# Patient Record
Sex: MALE | Race: WHITE | NOT HISPANIC OR LATINO | Employment: FULL TIME | URBAN - METROPOLITAN AREA
[De-identification: names, ages, dates, MRNs, and addresses within clinical notes are randomized per-mention and may not be internally consistent; named-entity substitution may affect disease eponyms.]

---

## 2017-03-01 ENCOUNTER — OFFICE VISIT (OUTPATIENT)
Dept: FAMILY MEDICINE | Facility: CLINIC | Age: 47
End: 2017-03-01
Payer: COMMERCIAL

## 2017-03-01 VITALS
HEART RATE: 86 BPM | WEIGHT: 235 LBS | TEMPERATURE: 98.9 F | BODY MASS INDEX: 33.64 KG/M2 | HEIGHT: 70 IN | OXYGEN SATURATION: 98 % | SYSTOLIC BLOOD PRESSURE: 138 MMHG | DIASTOLIC BLOOD PRESSURE: 86 MMHG

## 2017-03-01 DIAGNOSIS — Z12.5 SCREENING FOR PROSTATE CANCER: ICD-10-CM

## 2017-03-01 DIAGNOSIS — Z13.6 CARDIOVASCULAR SCREENING; LDL GOAL LESS THAN 130: ICD-10-CM

## 2017-03-01 DIAGNOSIS — Z00.00 ENCOUNTER FOR ROUTINE ADULT HEALTH EXAMINATION WITHOUT ABNORMAL FINDINGS: Primary | ICD-10-CM

## 2017-03-01 DIAGNOSIS — Z12.11 SCREEN FOR COLON CANCER: ICD-10-CM

## 2017-03-01 DIAGNOSIS — M10.09 IDIOPATHIC GOUT OF MULTIPLE SITES, UNSPECIFIED CHRONICITY: ICD-10-CM

## 2017-03-01 LAB
ALBUMIN SERPL-MCNC: 4.2 G/DL (ref 3.4–5)
ALBUMIN UR-MCNC: NEGATIVE MG/DL
ALP SERPL-CCNC: 54 U/L (ref 40–150)
ALT SERPL W P-5'-P-CCNC: 44 U/L (ref 0–70)
ANION GAP SERPL CALCULATED.3IONS-SCNC: 8 MMOL/L (ref 3–14)
APPEARANCE UR: CLEAR
AST SERPL W P-5'-P-CCNC: 17 U/L (ref 0–45)
BILIRUB SERPL-MCNC: 0.5 MG/DL (ref 0.2–1.3)
BILIRUB UR QL STRIP: NEGATIVE
BUN SERPL-MCNC: 16 MG/DL (ref 7–30)
CALCIUM SERPL-MCNC: 9.3 MG/DL (ref 8.5–10.1)
CHLORIDE SERPL-SCNC: 103 MMOL/L (ref 94–109)
CHOLEST SERPL-MCNC: 289 MG/DL
CK SERPL-CCNC: 97 U/L (ref 30–300)
CO2 SERPL-SCNC: 28 MMOL/L (ref 20–32)
COLOR UR AUTO: YELLOW
CREAT SERPL-MCNC: 0.88 MG/DL (ref 0.66–1.25)
CREAT UR-MCNC: 176 MG/DL
ERYTHROCYTE [DISTWIDTH] IN BLOOD BY AUTOMATED COUNT: 13.3 % (ref 10–15)
GFR SERPL CREATININE-BSD FRML MDRD: NORMAL ML/MIN/1.7M2
GLUCOSE SERPL-MCNC: 92 MG/DL (ref 70–99)
GLUCOSE UR STRIP-MCNC: NEGATIVE MG/DL
HCT VFR BLD AUTO: 47.4 % (ref 40–53)
HDLC SERPL-MCNC: 55 MG/DL
HGB BLD-MCNC: 16.1 G/DL (ref 13.3–17.7)
HGB UR QL STRIP: NEGATIVE
KETONES UR STRIP-MCNC: NEGATIVE MG/DL
LDLC SERPL CALC-MCNC: 197 MG/DL
LEUKOCYTE ESTERASE UR QL STRIP: NEGATIVE
MCH RBC QN AUTO: 31.1 PG (ref 26.5–33)
MCHC RBC AUTO-ENTMCNC: 34 G/DL (ref 31.5–36.5)
MCV RBC AUTO: 92 FL (ref 78–100)
MICROALBUMIN UR-MCNC: 8 MG/L
MICROALBUMIN/CREAT UR: 4.76 MG/G CR (ref 0–17)
NITRATE UR QL: NEGATIVE
NONHDLC SERPL-MCNC: 234 MG/DL
PH UR STRIP: 5.5 PH (ref 5–7)
PLATELET # BLD AUTO: 201 10E9/L (ref 150–450)
POTASSIUM SERPL-SCNC: 4.3 MMOL/L (ref 3.4–5.3)
PROT SERPL-MCNC: 7.8 G/DL (ref 6.8–8.8)
PSA SERPL-ACNC: 0.52 UG/L (ref 0–4)
RBC # BLD AUTO: 5.18 10E12/L (ref 4.4–5.9)
SODIUM SERPL-SCNC: 139 MMOL/L (ref 133–144)
SP GR UR STRIP: 1.02 (ref 1–1.03)
T4 FREE SERPL-MCNC: 0.87 NG/DL (ref 0.76–1.46)
TRIGL SERPL-MCNC: 187 MG/DL
TSH SERPL DL<=0.005 MIU/L-ACNC: 4.06 MU/L (ref 0.4–4)
URATE SERPL-MCNC: 7.1 MG/DL (ref 3.5–7.2)
URN SPEC COLLECT METH UR: NORMAL
UROBILINOGEN UR STRIP-ACNC: 0.2 EU/DL (ref 0.2–1)
WBC # BLD AUTO: 6 10E9/L (ref 4–11)

## 2017-03-01 PROCEDURE — 85027 COMPLETE CBC AUTOMATED: CPT | Performed by: FAMILY MEDICINE

## 2017-03-01 PROCEDURE — 80053 COMPREHEN METABOLIC PANEL: CPT | Performed by: FAMILY MEDICINE

## 2017-03-01 PROCEDURE — 82043 UR ALBUMIN QUANTITATIVE: CPT | Performed by: FAMILY MEDICINE

## 2017-03-01 PROCEDURE — 80061 LIPID PANEL: CPT | Performed by: FAMILY MEDICINE

## 2017-03-01 PROCEDURE — 81003 URINALYSIS AUTO W/O SCOPE: CPT | Performed by: FAMILY MEDICINE

## 2017-03-01 PROCEDURE — 84550 ASSAY OF BLOOD/URIC ACID: CPT | Performed by: FAMILY MEDICINE

## 2017-03-01 PROCEDURE — 84439 ASSAY OF FREE THYROXINE: CPT | Performed by: FAMILY MEDICINE

## 2017-03-01 PROCEDURE — 99396 PREV VISIT EST AGE 40-64: CPT | Performed by: FAMILY MEDICINE

## 2017-03-01 PROCEDURE — 82550 ASSAY OF CK (CPK): CPT | Performed by: FAMILY MEDICINE

## 2017-03-01 PROCEDURE — 36415 COLL VENOUS BLD VENIPUNCTURE: CPT | Performed by: FAMILY MEDICINE

## 2017-03-01 PROCEDURE — 84443 ASSAY THYROID STIM HORMONE: CPT | Performed by: FAMILY MEDICINE

## 2017-03-01 PROCEDURE — G0103 PSA SCREENING: HCPCS | Performed by: FAMILY MEDICINE

## 2017-03-01 NOTE — NURSING NOTE
"Chief Complaint   Patient presents with     Physical       Initial /86  Pulse 86  Temp 98.9  F (37.2  C) (Oral)  Ht 5' 10\" (1.778 m)  Wt 235 lb (106.6 kg)  SpO2 98%  BMI 33.72 kg/m2 Estimated body mass index is 33.72 kg/(m^2) as calculated from the following:    Height as of this encounter: 5' 10\" (1.778 m).    Weight as of this encounter: 235 lb (106.6 kg)..  BP completed using cuff size: heavenly Lombardi MA  "

## 2017-03-01 NOTE — PROGRESS NOTES
SUBJECTIVE:     CC: Bebo Lamb is an 47 year old male who presents for preventative health visit.     FH Prostate cancer - many close relatives    Gout -  No recent issues.    Right tib/fib fracture - Aches sometimes during heavy activity otherwise no complaints     Blood Pressure  BP Readings from Last 3 Encounters:   03/01/17 138/86   01/10/14 130/84     Healthy Habits:    Do you get at least three servings of calcium containing foods daily (dairy, green leafy vegetables, etc.)? yes    Amount of exercise or daily activities, outside of work: 1-2 day(s) per week    Problems taking medications regularly No    Medication side effects: No    Have you had an eye exam in the past two years? no    Do you see a dentist twice per year? No    Do you have sleep apnea, excessive snoring or daytime drowsiness?no    Today's PHQ-2 Score: No flowsheet data found.    Abuse: Current or Past(Physical, Sexual or Emotional)- No  Do you feel safe in your environment - Yes    Social History   Substance Use Topics     Smoking status: Never Smoker     Smokeless tobacco: Never Used     Alcohol use 5.0 oz/week     10 Standard drinks or equivalent per week      Comment: 1.5 drinks per day avg     The patient does not drink >3 drinks per day nor >7 drinks per week.    Reviewed orders with patient. Reviewed health maintenance and updated orders accordingly - Yes    Reviewed and updated as needed this visit by clinical staff  Tobacco  Allergies  Meds  Med Hx  Surg Hx  Fam Hx  Soc Hx      Reviewed and updated as needed this visit by Provider        Health Maintenance     Colonoscopy: Due at 51   FIT:  Not on file-ordered              PSA:  Not on file-ordered    DEXA:  N/A    Health Maintenance Due   Topic Date Due     LIPID SCREEN Q5 YR MALE (SYSTEM ASSIGNED)  01/19/2006       Current Problem List    Patient Active Problem List   Diagnosis     CARDIOVASCULAR SCREENING; LDL GOAL LESS THAN 130     Gout       Past Medical History    Past  "Medical History   Diagnosis Date     CARDIOVASCULAR SCREENING; LDL GOAL LESS THAN 130      Gout        Past Surgical History    Past Surgical History   Procedure Laterality Date     Surgical history of -   2009     Rt tib/fib Fx with ORIF       Current Medications    No current outpatient prescriptions on file.       Allergies    No Known Allergies    Immunizations    Immunization History   Administered Date(s) Administered     Tdap (Adacel,Boostrix) 01/01/2009       Family History    Family History   Problem Relation Age of Onset     Prostate Cancer Father 50     Prostate Cancer Paternal Grandfather      Neurologic Disorder Paternal Grandfather      Parkinson's     CANCER Brother 25     non hodkins lymphoma     Prostate Cancer Brother 55       Social History    Social History     Social History     Marital status: Single     Spouse name: Maryam Morales     Number of children: 0     Years of education: 16     Occupational History     Not on file.     Social History Main Topics     Smoking status: Never Smoker     Smokeless tobacco: Never Used     Alcohol use 5.0 oz/week     10 Standard drinks or equivalent per week      Comment: 1.5 drinks per day avg     Drug use: No     Sexual activity: Yes     Partners: Female     Other Topics Concern     Parent/Sibling W/ Cabg, Mi Or Angioplasty Before 65f 55m? No     Caffeine Concern Yes     2 cans daily     Exercise Yes     occas     Seat Belt Yes     Social History Narrative       ROS:  Constitutional, HEENT, cardiovascular, pulmonary, GI, , musculoskeletal, neuro, skin, endocrine and psych systems are negative, except as in HPI or otherwise noted       Problem list, Medication list, Allergies, and Medical/Social/Surgical histories reviewed in [facility] and updated as appropriate.  OBJECTIVE:     /86  Pulse 86  Temp 98.9  F (37.2  C) (Oral)  Ht 5' 10\" (1.778 m)  Wt 235 lb (106.6 kg)  SpO2 98%  BMI 33.72 kg/m2  EXAM:  GENERAL: healthy, alert and no distress Overweight "   EYES: Eyes grossly normal to inspection, PERRL and conjunctivae and sclerae normal  HENT: ear canals and TM's normal, nose and mouth without ulcers or lesions  NECK: no adenopathy, no asymmetry, masses, or scars and thyroid normal to palpation  RESP: lungs clear to auscultation - no rales, rhonchi or wheezes  CV: regular rate and rhythm, normal S1 S2, no S3 or S4, no murmur, click or rub, no peripheral edema and peripheral pulses strong  ABDOMEN: soft, nontender, no hepatosplenomegaly, no masses and bowel sounds normal   (male): normal male genitalia without lesions or urethral discharge, no hernia  RECTAL: normal sphincter tone, no rectal masses, prostate trace increased size, smooth, nontender without nodules or masses  MS: no gross musculoskeletal defects noted, no edema  SKIN: no suspicious lesions or rashes  NEURO: Normal strength and tone, mentation intact and speech normal  PSYCH: mentation appears normal, affect normal/bright    ASSESSMENT/PLAN:         ICD-10-CM    1. Encounter for routine adult health examination without abnormal findings Z00.00 Comprehensive metabolic panel     CK total     CBC with platelets     Albumin Random Urine Quantitative     TSH with free T4 reflex     Prostate spec antigen screen     *UA reflex to Microscopic and Culture ([facility] (except [place] and [place])     Lipid panel reflex to direct LDL     Uric acid     Fecal colorectal cancer screen (FIT)   2. Idiopathic gout of multiple sites, unspecified chronicity M10.09 Uric acid   3. CARDIOVASCULAR SCREENING; LDL GOAL LESS THAN 130 Z13.6 Lipid panel reflex to direct LDL   4. Screening for prostate cancer Z12.5 Prostate spec antigen screen   5. Screen for colon cancer Z12.11 Fecal colorectal cancer screen (FIT)     Discussed treatment/modality options, including risk and benefits, he desires advised alcohol consumption 1oz per day or less, advised aspirin 81 mg po daily, advised 1 multivitamin  "per day, advised calcium 5325-3322 mg/d and Vitamin D 800-1200 IU/d, advised dentist every 6 months, advised diet, exercise, and weight loss, advised opthalmologist every 1-2 years, advised self testicular exam q month, further health care maintenance, further lab(s) and observation. All diagnosis above reviewed and noted above, otherwise stable.  See [facility] orders for further details.  Follow up in 6-12 month(s) and as needed.    Fasting labs, further HCM    Health Maintenance Due   Topic Date Due     LIPID SCREEN Q5 YR MALE (SYSTEM ASSIGNED)  01/19/2006   '      COUNSELING:  Reviewed preventive health counseling, as reflected in patient instructions    BP Screening:   Last 3 BP Readings:    BP Readings from Last 3 Encounters:   03/01/17 138/86   01/10/14 130/84       The following was recommended to the patient:  Re-screen BP within a year and recommended lifestyle modifications     reports that he has never smoked. He has never used smokeless tobacco.    Estimated body mass index is 33.72 kg/(m^2) as calculated from the following:    Height as of this encounter: 5' 10\" (1.778 m).    Weight as of this encounter: 235 lb (106.6 kg).   Weight management plan: Discussed healthy diet and exercise guidelines and patient will follow up in 12 months in clinic to re-evaluate.    Counseling Resources:  ATP IV Guidelines  Pooled Cohorts Equation Calculator  FRAX Risk Assessment  ICSI Preventive Guidelines  Dietary Guidelines for Americans, 2010  USDA's MyPlate  ASA Prophylaxis  Lung CA Screening    Yandel Novoa MD [facility] [place]    This document serves as a record of the services and decisions personally performed and made by Yandel Novoa MD [facility]. It was created on their behalf by Clemente Feliz, a trained medical scribe. The creation of this document is based the provider's statements to the medical scribe.  Clemente Feliz March 1, 2017 7:46 AM             Yandel Novoa MD, [facility] - [place] [address]  [address]    [phone] Fax

## 2017-03-01 NOTE — MR AVS SNAPSHOT
After Visit Summary   3/1/2017    Bebo Lamb    MRN: [id number]           Patient Information     Date Of Birth          1970        Visit Information        Provider Department      3/1/2017 7:40 AM Yandel Novoa MD [facility]        Today's Diagnoses     Encounter for routine adult health examination without abnormal findings    -  1    Idiopathic gout of multiple sites, unspecified chronicity        CARDIOVASCULAR SCREENING; LDL GOAL LESS THAN 130        Screening for prostate cancer        Screen for colon cancer          Care Instructions    3/1/17        [facility] [place]                        To reach your care team during and after hours:   [phone]  To reach our pharmacy:        [phone]    Clinic Hours                        Our clinic hours are:    Monday   7:30 am to 7:00 pm                  Tuesday through Friday 7:30 am to 5:00 pm                             Saturday   8:00 am to 12:00 pm      Sunday   Closed      Pharmacy Hours                        Our pharmacy hours are:    Monday   8:30 am to 7:00 pm       Tuesday to Friday  8:30 am to 6:00 pm                       Saturday    9:00 am to 1:00 pm              Sunday    Closed              There is also information available at our web site:  [email or web address]    If your provider ordered any lab tests and you do not receive the results within 10 business days, please call the clinic.    If you need a medication refill please contact your pharmacy.  Please allow 2-3 business days for your refill to be completed.    Our clinic offers telephone visits and e visits.  Please ask one of your team members to explain more.      Use Shopcliqhart (secure email communication and access to your chart) to send your primary care provider a message or make an appointment. Ask someone on your Team how to sign up for [employer].  Immunizations                      Immunization History   Administered Date(s)  Administered     Tdap (Adacel,Boostrix) 01/01/2009        Health Maintenance                         Health Maintenance Due   Topic Date Due     Cholesterol Lab - every 5 years  01/19/2006     Flu Vaccine - yearly  09/01/2016       Preventive Health Recommendations  Male Ages 40 to 49    Yearly exam:             See your health care provider every year in order to  o   Review health changes.   o   Discuss preventive care.    o   Review your medicines if your doctor has prescribed any.    You should be tested each year for STDs (sexually transmitted diseases) if you re at risk.     Have a cholesterol test every 5 years.     Have a colonoscopy (test for colon cancer) if someone in your family has had colon cancer or polyps before age 50.     After age 45, have a diabetes test (fasting glucose). If you are at risk for diabetes, you should have this test every 3 years.      Talk with your health care provider about whether or not a prostate cancer screening test (PSA) is right for you.    Shots: Get a flu shot each year. Get a tetanus shot every 10 years.     Nutrition:    Eat at least 5 servings of fruits and vegetables daily.     Eat whole-grain bread, whole-wheat pasta and brown rice instead of white grains and rice.     Talk to your provider about Calcium and Vitamin D.     Lifestyle    Exercise for at least 150 minutes a week (30 minutes a day, 5 days a week). This will help you control your weight and prevent disease.     Limit alcohol to one drink per day.     No smoking.     Wear sunscreen to prevent skin cancer.     See your dentist every six months for an exam and cleaning.            Follow-ups after your visit        Future tests that were ordered for you today     Open Future Orders        Priority Expected Expires Ordered    Fecal colorectal cancer screen (FIT) Routine 3/21/2017 5/23/2017 3/1/2017            Who to contact     If you have questions or need follow up information about today's clinic visit  "or your schedule please contact [facility] directly at [phone].  Normal or non-critical lab and imaging results will be communicated to you by MyChart, letter or phone within 4 business days after the clinic has received the results. If you do not hear from us within 7 days, please contact the clinic through NitroSellhart or phone. If you have a critical or abnormal lab result, we will notify you by phone as soon as possible.  Submit refill requests through Sendbloom or call your pharmacy and they will forward the refill request to us. Please allow 3 business days for your refill to be completed.          Additional Information About Your Visit        NitroSellharOutSystems Information     Sendbloom lets you send messages to your doctor, view your test results, renew your prescriptions, schedule appointments and more. To sign up, go to [email or web address] . Click on \"Log in\" on the left side of the screen, which will take you to the Welcome page. Then click on \"Sign up Now\" on the right side of the page.     You will be asked to enter the access code listed below, as well as some personal information. Please follow the directions to create your username and password.     Your access code is: [email or web address]  Expires: 2017  8:22 AM     Your access code will  in 90 days. If you need help or a new code, please call your [place] clinic or [phone].        Care EveryWhere ID     This is your Care EveryWhere ID. This could be used by other organizations to access your [facility] medical records  [id number]        Your Vitals Were     Pulse Temperature Height Pulse Oximetry BMI (Body Mass Index)       86 98.9  F (37.2  C) (Oral) 5' 10\" (1.778 m) 98% 33.72 kg/m2        Blood Pressure from Last 3 Encounters:   17 138/86   01/10/14 130/84    Weight from Last 3 Encounters:   17 235 lb (106.6 kg)   01/10/14 240 lb (108.9 kg)              We Performed the Following     *UA reflex to Microscopic and " Culture ([facility] (except [place] and [place])     Albumin Random Urine Quantitative     CBC with platelets     CK total     Comprehensive metabolic panel     Lipid panel reflex to direct LDL     Prostate spec antigen screen     TSH with free T4 reflex     Uric acid        Primary Care Provider Office Phone # Fax #    Yandel Novoa [phone]       [facility] [address] [address]        Thank you!     Thank you for choosing [facility]  for your care. Our goal is always to provide you with excellent care. Hearing back from our patients is one way we can continue to improve our services. Please take a few minutes to complete the written survey that you may receive in the mail after your visit with us. Thank you!             Your Updated Medication List - Protect others around you: Learn how to safely use, store and throw away your medicines at www.disposemymeds.org.      Notice  As of 3/1/2017  8:22 AM    You have not been prescribed any medications.

## 2017-03-01 NOTE — PATIENT INSTRUCTIONS
3/1/17        [facility]                        To reach your care team during and after hours:   [phone]  To reach our pharmacy:        [phone]    Clinic Hours                        Our clinic hours are:    Monday   7:30 am to 7:00 pm                  Tuesday through Friday 7:30 am to 5:00 pm                             Saturday   8:00 am to 12:00 pm      Sunday   Closed      Pharmacy Hours                        Our pharmacy hours are:    Monday   8:30 am to 7:00 pm       Tuesday to Friday  8:30 am to 6:00 pm                       Saturday    9:00 am to 1:00 pm              Sunday    Closed              There is also information available at our web site:  [email or web address]    If your provider ordered any lab tests and you do not receive the results within 10 business days, please call the clinic.    If you need a medication refill please contact your pharmacy.  Please allow 2-3 business days for your refill to be completed.    Our clinic offers telephone visits and e visits.  Please ask one of your team members to explain more.      Use Busbud (secure email communication and access to your chart) to send your primary care provider a message or make an appointment. Ask someone on your Team how to sign up for Busbud.  Immunizations                      Immunization History   Administered Date(s) Administered     Tdap (Adacel,Boostrix) 01/01/2009        Health Maintenance                         Health Maintenance Due   Topic Date Due     Cholesterol Lab - every 5 years  01/19/2006     Flu Vaccine - yearly  09/01/2016       Preventive Health Recommendations  Male Ages 40 to 49    Yearly exam:             See your health care provider every year in order to  o   Review health changes.   o   Discuss preventive care.    o   Review your medicines if your doctor has prescribed any.    You should be tested each year for STDs (sexually transmitted diseases) if you re at risk.     Have a  cholesterol test every 5 years.     Have a colonoscopy (test for colon cancer) if someone in your family has had colon cancer or polyps before age 50.     After age 45, have a diabetes test (fasting glucose). If you are at risk for diabetes, you should have this test every 3 years.      Talk with your health care provider about whether or not a prostate cancer screening test (PSA) is right for you.    Shots: Get a flu shot each year. Get a tetanus shot every 10 years.     Nutrition:    Eat at least 5 servings of fruits and vegetables daily.     Eat whole-grain bread, whole-wheat pasta and brown rice instead of white grains and rice.     Talk to your provider about Calcium and Vitamin D.     Lifestyle    Exercise for at least 150 minutes a week (30 minutes a day, 5 days a week). This will help you control your weight and prevent disease.     Limit alcohol to one drink per day.     No smoking.     Wear sunscreen to prevent skin cancer.     See your dentist every six months for an exam and cleaning.

## 2017-03-02 DIAGNOSIS — R79.89 TSH ELEVATION: ICD-10-CM

## 2017-03-02 DIAGNOSIS — E78.5 HYPERLIPIDEMIA LDL GOAL <130: Primary | ICD-10-CM

## 2017-03-02 RX ORDER — ATORVASTATIN CALCIUM 20 MG/1
20 TABLET, FILM COATED ORAL DAILY
Qty: 90 TABLET | Refills: 3 | Status: SHIPPED | OUTPATIENT
Start: 2017-03-02 | End: 2023-01-04

## 2017-03-09 PROCEDURE — 82274 ASSAY TEST FOR BLOOD FECAL: CPT | Performed by: FAMILY MEDICINE

## 2017-03-14 DIAGNOSIS — Z00.00 ENCOUNTER FOR ROUTINE ADULT HEALTH EXAMINATION WITHOUT ABNORMAL FINDINGS: ICD-10-CM

## 2017-03-14 DIAGNOSIS — Z12.11 SCREEN FOR COLON CANCER: ICD-10-CM

## 2017-03-14 LAB — HEMOCCULT STL QL IA: NEGATIVE

## 2017-07-13 ENCOUNTER — TELEPHONE (OUTPATIENT)
Dept: LAB | Facility: CLINIC | Age: 47
End: 2017-07-13

## 2017-08-07 ENCOUNTER — TELEPHONE (OUTPATIENT)
Dept: LAB | Facility: CLINIC | Age: 47
End: 2017-08-07

## 2017-08-07 NOTE — TELEPHONE ENCOUNTER
Pt was reached out to 3 times then letter was mailed 7/13/2017    Orders deleted     Jaqui Shelton RN, BSN  [facility]

## 2017-08-07 NOTE — TELEPHONE ENCOUNTER
Please call patient to schedule fasting lab only appointment.    Lulu Olivera, ABELINO, RN, N  [facility]) [phone]

## 2017-08-07 NOTE — TELEPHONE ENCOUNTER
Labs on 2017 have  and a letter was sent on 2017. Patient has not followed up. Routing to team to address.    [facility]

## 2020-03-02 ENCOUNTER — HEALTH MAINTENANCE LETTER (OUTPATIENT)
Age: 50
End: 2020-03-02

## 2020-12-20 ENCOUNTER — HEALTH MAINTENANCE LETTER (OUTPATIENT)
Age: 50
End: 2020-12-20

## 2021-04-24 ENCOUNTER — HEALTH MAINTENANCE LETTER (OUTPATIENT)
Age: 51
End: 2021-04-24

## 2021-10-03 ENCOUNTER — HEALTH MAINTENANCE LETTER (OUTPATIENT)
Age: 51
End: 2021-10-03

## 2022-05-15 ENCOUNTER — HEALTH MAINTENANCE LETTER (OUTPATIENT)
Age: 52
End: 2022-05-15

## 2022-09-10 ENCOUNTER — HEALTH MAINTENANCE LETTER (OUTPATIENT)
Age: 52
End: 2022-09-10

## 2023-01-03 ASSESSMENT — ENCOUNTER SYMPTOMS
HEMATOCHEZIA: 0
EYE PAIN: 0
FREQUENCY: 0
PARESTHESIAS: 0
HEADACHES: 0
SHORTNESS OF BREATH: 0
JOINT SWELLING: 0
ARTHRALGIAS: 0
SORE THROAT: 0
MYALGIAS: 0
PALPITATIONS: 0
COUGH: 0
CHILLS: 0
HEMATURIA: 0
DIZZINESS: 0
NAUSEA: 0
DYSURIA: 0
CONSTIPATION: 0
DIARRHEA: 0
HEARTBURN: 0
NERVOUS/ANXIOUS: 0
FEVER: 0
ABDOMINAL PAIN: 0
WEAKNESS: 0

## 2023-01-04 ENCOUNTER — OFFICE VISIT (OUTPATIENT)
Dept: FAMILY MEDICINE | Facility: CLINIC | Age: 53
End: 2023-01-04
Payer: COMMERCIAL

## 2023-01-04 VITALS
RESPIRATION RATE: 16 BRPM | DIASTOLIC BLOOD PRESSURE: 78 MMHG | SYSTOLIC BLOOD PRESSURE: 128 MMHG | BODY MASS INDEX: 35.36 KG/M2 | TEMPERATURE: 97 F | HEIGHT: 70 IN | HEART RATE: 90 BPM | OXYGEN SATURATION: 98 % | WEIGHT: 247 LBS

## 2023-01-04 DIAGNOSIS — Z00.00 ROUTINE GENERAL MEDICAL EXAMINATION AT A HEALTH CARE FACILITY: Primary | ICD-10-CM

## 2023-01-04 DIAGNOSIS — M10.09 IDIOPATHIC GOUT OF MULTIPLE SITES, UNSPECIFIED CHRONICITY: ICD-10-CM

## 2023-01-04 DIAGNOSIS — Z12.5 SCREENING FOR PROSTATE CANCER: ICD-10-CM

## 2023-01-04 DIAGNOSIS — Z12.11 SCREEN FOR COLON CANCER: ICD-10-CM

## 2023-01-04 DIAGNOSIS — Z23 NEED FOR SHINGLES VACCINE: ICD-10-CM

## 2023-01-04 DIAGNOSIS — E66.01 MORBID OBESITY (H): ICD-10-CM

## 2023-01-04 DIAGNOSIS — E78.5 HYPERLIPIDEMIA LDL GOAL <130: ICD-10-CM

## 2023-01-04 DIAGNOSIS — Z23 NEED FOR TDAP VACCINATION: ICD-10-CM

## 2023-01-04 DIAGNOSIS — Z51.81 MEDICATION MONITORING ENCOUNTER: ICD-10-CM

## 2023-01-04 LAB
ALBUMIN SERPL BCG-MCNC: 4.5 G/DL (ref 3.5–5.2)
ALBUMIN UR-MCNC: NEGATIVE MG/DL
ALP SERPL-CCNC: 48 U/L (ref 40–129)
ALT SERPL W P-5'-P-CCNC: 42 U/L (ref 10–50)
ANION GAP SERPL CALCULATED.3IONS-SCNC: 14 MMOL/L (ref 7–15)
APPEARANCE UR: CLEAR
AST SERPL W P-5'-P-CCNC: 24 U/L (ref 10–50)
BILIRUB SERPL-MCNC: 0.5 MG/DL
BILIRUB UR QL STRIP: NEGATIVE
BUN SERPL-MCNC: 17.6 MG/DL (ref 6–20)
CALCIUM SERPL-MCNC: 9.3 MG/DL (ref 8.6–10)
CHLORIDE SERPL-SCNC: 103 MMOL/L (ref 98–107)
CHOLEST SERPL-MCNC: 292 MG/DL
CK SERPL-CCNC: 99 U/L (ref 39–308)
COLOR UR AUTO: YELLOW
CREAT SERPL-MCNC: 0.94 MG/DL (ref 0.67–1.17)
CREAT UR-MCNC: 246 MG/DL
DEPRECATED HCO3 PLAS-SCNC: 25 MMOL/L (ref 22–29)
ERYTHROCYTE [DISTWIDTH] IN BLOOD BY AUTOMATED COUNT: 12.7 % (ref 10–15)
GFR SERPL CREATININE-BSD FRML MDRD: >90 ML/MIN/1.73M2
GLUCOSE SERPL-MCNC: 102 MG/DL (ref 70–99)
GLUCOSE UR STRIP-MCNC: NEGATIVE MG/DL
HCT VFR BLD AUTO: 44.7 % (ref 40–53)
HDLC SERPL-MCNC: 47 MG/DL
HGB BLD-MCNC: 15.2 G/DL (ref 13.3–17.7)
HGB UR QL STRIP: NEGATIVE
KETONES UR STRIP-MCNC: NEGATIVE MG/DL
LDLC SERPL CALC-MCNC: 205 MG/DL
LEUKOCYTE ESTERASE UR QL STRIP: NEGATIVE
MCH RBC QN AUTO: 30.6 PG (ref 26.5–33)
MCHC RBC AUTO-ENTMCNC: 34 G/DL (ref 31.5–36.5)
MCV RBC AUTO: 90 FL (ref 78–100)
MICROALBUMIN UR-MCNC: <12 MG/L
MICROALBUMIN/CREAT UR: NORMAL MG/G{CREAT}
NITRATE UR QL: NEGATIVE
NONHDLC SERPL-MCNC: 245 MG/DL
PH UR STRIP: 5.5 [PH] (ref 5–7)
PLATELET # BLD AUTO: 231 10E3/UL (ref 150–450)
POTASSIUM SERPL-SCNC: 4.4 MMOL/L (ref 3.4–5.3)
PROT SERPL-MCNC: 7.1 G/DL (ref 6.4–8.3)
PSA SERPL-MCNC: 0.39 NG/ML (ref 0–3.5)
RBC # BLD AUTO: 4.97 10E6/UL (ref 4.4–5.9)
SODIUM SERPL-SCNC: 142 MMOL/L (ref 136–145)
SP GR UR STRIP: >=1.03 (ref 1–1.03)
TRIGL SERPL-MCNC: 200 MG/DL
TSH SERPL DL<=0.005 MIU/L-ACNC: 2.47 UIU/ML (ref 0.3–4.2)
URATE SERPL-MCNC: 7.9 MG/DL (ref 3.4–7)
UROBILINOGEN UR STRIP-ACNC: 0.2 E.U./DL
WBC # BLD AUTO: 5.3 10E3/UL (ref 4–11)

## 2023-01-04 PROCEDURE — 90715 TDAP VACCINE 7 YRS/> IM: CPT | Performed by: FAMILY MEDICINE

## 2023-01-04 PROCEDURE — 81003 URINALYSIS AUTO W/O SCOPE: CPT | Performed by: FAMILY MEDICINE

## 2023-01-04 PROCEDURE — 99214 OFFICE O/P EST MOD 30 MIN: CPT | Mod: 25 | Performed by: FAMILY MEDICINE

## 2023-01-04 PROCEDURE — 84443 ASSAY THYROID STIM HORMONE: CPT | Performed by: FAMILY MEDICINE

## 2023-01-04 PROCEDURE — 82570 ASSAY OF URINE CREATININE: CPT | Performed by: FAMILY MEDICINE

## 2023-01-04 PROCEDURE — 90472 IMMUNIZATION ADMIN EACH ADD: CPT | Performed by: FAMILY MEDICINE

## 2023-01-04 PROCEDURE — 99386 PREV VISIT NEW AGE 40-64: CPT | Mod: 25 | Performed by: FAMILY MEDICINE

## 2023-01-04 PROCEDURE — G0103 PSA SCREENING: HCPCS | Performed by: FAMILY MEDICINE

## 2023-01-04 PROCEDURE — 80053 COMPREHEN METABOLIC PANEL: CPT | Performed by: FAMILY MEDICINE

## 2023-01-04 PROCEDURE — 90750 HZV VACC RECOMBINANT IM: CPT | Performed by: FAMILY MEDICINE

## 2023-01-04 PROCEDURE — 82550 ASSAY OF CK (CPK): CPT | Performed by: FAMILY MEDICINE

## 2023-01-04 PROCEDURE — 82043 UR ALBUMIN QUANTITATIVE: CPT | Performed by: FAMILY MEDICINE

## 2023-01-04 PROCEDURE — 85027 COMPLETE CBC AUTOMATED: CPT | Performed by: FAMILY MEDICINE

## 2023-01-04 PROCEDURE — 80061 LIPID PANEL: CPT | Performed by: FAMILY MEDICINE

## 2023-01-04 PROCEDURE — 36415 COLL VENOUS BLD VENIPUNCTURE: CPT | Performed by: FAMILY MEDICINE

## 2023-01-04 PROCEDURE — 84550 ASSAY OF BLOOD/URIC ACID: CPT | Performed by: FAMILY MEDICINE

## 2023-01-04 PROCEDURE — 90471 IMMUNIZATION ADMIN: CPT | Performed by: FAMILY MEDICINE

## 2023-01-04 ASSESSMENT — ENCOUNTER SYMPTOMS
FREQUENCY: 0
HEMATOCHEZIA: 0
MYALGIAS: 0
CHILLS: 0
HEMATURIA: 0
PALPITATIONS: 0
JOINT SWELLING: 0
WEAKNESS: 0
SHORTNESS OF BREATH: 0
EYE PAIN: 0
DYSURIA: 0
NERVOUS/ANXIOUS: 0
HEARTBURN: 0
DIARRHEA: 0
DIZZINESS: 0
ABDOMINAL PAIN: 0
SORE THROAT: 0
NAUSEA: 0
ARTHRALGIAS: 0
HEADACHES: 0
FEVER: 0
PARESTHESIAS: 0
CONSTIPATION: 0
COUGH: 0

## 2023-01-04 NOTE — PROGRESS NOTES
[facility]  [place]    SUBJECTIVE    CC: Bebo is an 52 year old who presents for preventative health visit.          Patient has been advised of split billing requirements and indicates understanding: Yes     Healthy Habits:     Getting at least 3 servings of Calcium per day:  Yes    Bi-annual eye exam:  Yes    Dental care twice a year:  NO    Sleep apnea or symptoms of sleep apnea:  None    Diet:  Regular (no restrictions)    Frequency of exercise:  1 day/week    Duration of exercise:  Less than 15 minutes    Taking medications regularly:  Yes    Medication side effects:  Not applicable    PHQ-2 Total Score: 0    Additional concerns today:  No    Gout    No issues    Hyperlipidemia Follow-Up      Are you regularly taking any medication or supplement to lower your cholesterol?   No    Are you having muscle aches or other side effects that you think could be caused by your cholesterol lowering medication?  No    Recent Labs   Lab Test 03/01/17  0802   CHOL 289*   HDL 55   *   TRIG 187*       Today's PHQ-2 Score:   PHQ-2 ( 1999 Pfizer) 1/3/2023   Q1: Little interest or pleasure in doing things 0   Q2: Feeling down, depressed or hopeless 0   PHQ-2 Score 0   Q1: Little interest or pleasure in doing things Not at all   Q2: Feeling down, depressed or hopeless Not at all   PHQ-2 Score 0       Have you ever done Advance Care Planning? (For example, a Health Directive, POLST, or a discussion with a medical provider or your loved ones about your wishes): No, advance care planning information given to patient to review.  Patient plans to discuss their wishes with loved ones or provider.      Social History     Tobacco Use     Smoking status: Never     Smokeless tobacco: Never   Substance Use Topics     Alcohol use: Yes     Alcohol/week: 8.3 standard drinks     Types: 10 Standard drinks or equivalent per week     Comment: 1.5 drinks per day avg     If you drink alcohol do you typically have >3 drinks per day  or >7 drinks per week? No    Alcohol Use 1/4/2023   Prescreen: >3 drinks/day or >7 drinks/week? -   Prescreen: >3 drinks/day or >7 drinks/week? No   AUDIT SCORE  -       Last PSA:   PSA   Date Value Ref Range Status   03/01/2017 0.52 0 - 4 ug/L Final     Comment:     Assay Method:  Chemiluminescence using Siemens Vista analyzer       Reviewed orders with patient. Reviewed health maintenance and updated orders accordingly - Yes    Reviewed and updated as needed this visit by clinical staff   Tobacco   Meds  Problems  Med Hx  Surg Hx         Reviewed and updated as needed this visit by Provider                 Review of Systems   Constitutional: Negative for chills and fever.   HENT: Negative for congestion, ear pain, hearing loss and sore throat.    Eyes: Negative for pain and visual disturbance.   Respiratory: Negative for cough and shortness of breath.    Cardiovascular: Negative for chest pain, palpitations and peripheral edema.   Gastrointestinal: Negative for abdominal pain, constipation, diarrhea, heartburn, hematochezia and nausea.   Genitourinary: Negative for dysuria, frequency, genital sores, hematuria, impotence, penile discharge and urgency.   Musculoskeletal: Negative for arthralgias, joint swelling and myalgias.   Skin: Negative for rash.   Neurological: Negative for dizziness, weakness, headaches and paresthesias.   Psychiatric/Behavioral: Negative for mood changes. The patient is not nervous/anxious.      Health Maintenance     Colonoscopy:  due   FIT:  NA              PSA:  pending   DEXA:  NA    Health Maintenance Due   Topic Date Due     HEPATITIS B IMMUNIZATION (1 of 3 - 3-dose series) Never done     COLORECTAL CANCER SCREENING  03/09/2018     COVID-19 Vaccine (4 - Booster for Pfizer series) 02/07/2022     LIPID  03/01/2022     INFLUENZA VACCINE (1) Never done       Current Problem List    Patient Active Problem List   Diagnosis     Gout     Hyperlipidemia LDL goal <130     Morbid obesity (H)        Past Medical History    Past Medical History:   Diagnosis Date     Gout      Hyperlipidemia LDL goal <130        Past Surgical History    Past Surgical History:   Procedure Laterality Date     SURGICAL HISTORY OF -   01/2009    Rt tib/fib Fx with ORIF       Current Medications    No current outpatient medications on file.       Allergies    No Known Allergies    Immunizations    Immunization History   Administered Date(s) Administered     COVID-19 Vaccine 12+ (Pfizer 2022) 04/01/2021, 04/21/2021, 12/13/2021     Tdap (Adacel,Boostrix) 01/01/2009, 01/04/2023     Zoster vaccine recombinant adjuvanted (SHINGRIX) 01/04/2023       Family History    Family History   Problem Relation Age of Onset     Prostate Cancer Father      Prostate Cancer Brother      Cancer Brother         non-Hodgkins lymphoma     Prostate Cancer Paternal Grandfather      Neurologic Disorder Paternal Grandfather         Parkinson's       Social History    Social History     Socioeconomic History     Marital status:      Spouse name: Maryam Morales     Number of children: 0     Years of education: 16     Highest education level: Not on file   Occupational History     Not on file   Tobacco Use     Smoking status: Never     Smokeless tobacco: Never   Vaping Use     Vaping Use: Never used   Substance and Sexual Activity     Alcohol use: Yes     Alcohol/week: 8.3 standard drinks     Types: 10 Standard drinks or equivalent per week     Comment: 1.5 drinks per day avg     Drug use: No     Sexual activity: Yes     Partners: Female     Birth control/protection: None   Other Topics Concern     Parent/sibling w/ CABG, MI or angioplasty before 65F 55M? No      Service Not Asked     Blood Transfusions Not Asked     Caffeine Concern Yes     Comment: 2 cans daily     Occupational Exposure Not Asked     Hobby Hazards Not Asked     Sleep Concern Not Asked     Stress Concern Not Asked     Weight Concern Not Asked     Special Diet Not Asked     Back  "Care Not Asked     Exercise Yes     Comment: occas     Bike Helmet Not Asked     Seat Belt Yes     Self-Exams Not Asked   Social History Narrative     Not on file     Social Determinants of Health     Financial Resource Strain: Not on file   Food Insecurity: Not on file   Transportation Needs: Not on file   Physical Activity: Not on file   Stress: Not on file   Social Connections: Not on file   Intimate Partner Violence: Not on file   Housing Stability: Not on file       ROS    CONSTITUTIONAL: NEGATIVE for fever, chills, change in weight  INTEGUMENTARY/SKIN: NEGATIVE for worrisome rashes, moles or lesions  EYES: NEGATIVE for vision changes or irritation  ENT/MOUTH: NEGATIVE for ear, mouth and throat problems  RESP: NEGATIVE for significant cough or SOB  BREAST: NEGATIVE for masses, tenderness or discharge  CV: NEGATIVE for chest pain, palpitations or peripheral edema  GI: NEGATIVE for nausea, abdominal pain, heartburn, or change in bowel habits  : NEGATIVE for frequency, dysuria, or hematuria  MUSCULOSKELETAL: NEGATIVE for significant arthralgias or myalgia  NEURO: NEGATIVE for weakness, dizziness or paresthesias  ENDOCRINE: NEGATIVE for temperature intolerance, skin/hair changes  HEME: NEGATIVE for bleeding problems  PSYCHIATRIC: NEGATIVE for changes in mood or affect    OBJECTIVE    /78 (BP Location: Right arm, Patient Position: Chair, Cuff Size: Adult Large)   Pulse 90   Temp 97  F (36.1  C) (Temporal)   Resp 16   Ht 1.778 m (5' 10\")   Wt 112 kg (247 lb)   SpO2 98%   BMI 35.44 kg/m      EXAM:    GENERAL: healthy, alert and no distress  EYES: Eyes grossly normal to inspection, PERRL and conjunctivae and sclerae normal  HENT: ear canals and TM's normal, nose and mouth without ulcers or lesions  NECK: no adenopathy, no asymmetry, masses, or scars and thyroid normal to palpation  RESP: lungs clear to auscultation - no rales, rhonchi or wheezes  CV: regular rate and rhythm, normal S1 S2, no S3 or S4, no " murmur, click or rub, no peripheral edema and peripheral pulses strong  ABDOMEN: soft, nontender, no hepatosplenomegaly, no masses and bowel sounds normal   (male): testicles normal without atrophy or masses, no hernias and penis normal without urethral discharge  RECTAL: normal sphincter tone, no rectal masses, prostate smooth, nontender without masses/nodules and prostate 2+ enlarged, nontender  MS: no gross musculoskeletal defects noted, no edema  SKIN: no suspicious lesions or rashes  NEURO: Normal strength and tone, mentation intact and speech normal  PSYCH: mentation appears normal, affect normal/bright  LYMPH: no cervical, supraclavicular, axillary, or inguinal adenopathy    DIAGNOSTICS/PROCEDURES    Pending    ASSESSMENT      ICD-10-CM    1. Routine general medical examination at a health care facility  Z00.00 Comprehensive metabolic panel     Lipid panel reflex to direct LDL Fasting     CBC with platelets     CK total     UA reflex to Microscopic and Culture     Albumin Random Urine Quantitative with Creat Ratio     TSH with free T4 reflex     Prostate Specific Antigen Screen     Fecal colorectal cancer screen FIT     Uric acid     REVIEW OF HEALTH MAINTENANCE PROTOCOL ORDERS     OFFICE/OUTPT VISIT,EST,LEVL IV     Comprehensive metabolic panel     Lipid panel reflex to direct LDL Fasting     CBC with platelets     CK total     UA reflex to Microscopic and Culture     Albumin Random Urine Quantitative with Creat Ratio     TSH with free T4 reflex     Prostate Specific Antigen Screen     Fecal colorectal cancer screen FIT     Uric acid      2. Hyperlipidemia LDL goal <130  E78.5 Comprehensive metabolic panel     Lipid panel reflex to direct LDL Fasting     CK total     REVIEW OF HEALTH MAINTENANCE PROTOCOL ORDERS     OFFICE/OUTPT VISIT,EST,LEVL IV     Comprehensive metabolic panel     Lipid panel reflex to direct LDL Fasting     CK total      3. Idiopathic gout of multiple sites, unspecified chronicity   M10.09 Uric acid     REVIEW OF HEALTH MAINTENANCE PROTOCOL ORDERS     OFFICE/OUTPT VISIT,EST,LEVL IV     Uric acid      4. Screening for prostate cancer  Z12.5 Prostate Specific Antigen Screen     REVIEW OF HEALTH MAINTENANCE PROTOCOL ORDERS     OFFICE/OUTPT VISIT,EST,LEVL IV     Prostate Specific Antigen Screen      5. Screen for colon cancer  Z12.11 Fecal colorectal cancer screen FIT     REVIEW OF HEALTH MAINTENANCE PROTOCOL ORDERS     Colonoscopy Screening  Referral     OFFICE/OUTPT VISIT,EST,LEVL IV     Fecal colorectal cancer screen FIT      6. Morbid obesity (H)  E66.01       7. Medication monitoring encounter  Z51.81 Comprehensive metabolic panel     Lipid panel reflex to direct LDL Fasting     CBC with platelets     CK total     UA reflex to Microscopic and Culture     Albumin Random Urine Quantitative with Creat Ratio     TSH with free T4 reflex     Uric acid     REVIEW OF HEALTH MAINTENANCE PROTOCOL ORDERS     OFFICE/OUTPT VISIT,EST,LEVL IV     Comprehensive metabolic panel     Lipid panel reflex to direct LDL Fasting     CBC with platelets     CK total     UA reflex to Microscopic and Culture     Albumin Random Urine Quantitative with Creat Ratio     TSH with free T4 reflex     Uric acid      8. Need for shingles vaccine  Z23 REVIEW OF HEALTH MAINTENANCE PROTOCOL ORDERS     ZOSTER VACCINE RECOMBINANT ADJUVANTED (SHINGRIX)     OFFICE/OUTPT VISIT,EST,LEVL IV      9. Need for Tdap vaccination  Z23 REVIEW OF HEALTH MAINTENANCE PROTOCOL ORDERS     TDAP VACCINE (Adacel, Boostrix)  [[id number]]          PLAN    Discussed treatment/modality options, including risk and benefits, he desires:    advised alcohol consumption 1oz per day or less, advised 1 multivitamin per day, advised calcium 8328-7979 mg/d and Vitamin D 800-1200 IU/d, advised dentist every 6 months, advised diet, exercise, and weight loss, advised opthalmologist every 1-2 years, advised self testicular exam q month, further health care  "maintenance, further lab(s) and observation    Discussed controversies surrounding PSA. Specifically reviewed 2017 USPSTF findings recommending discussion of PSA testing for men ages 55-69.  Reviewed findings of the  Randomized Study of Screening for Prostate Cancer which showed a 30% reduction in advanced stage prostate cancer and a 20% reduction in death rate from prostate cancer in this age group. PSA-based screening may prevent up to 2 deaths and up to 3 cases of metastatic disease per 1,000 men screened over 13 years.    We've elected to do PSA this year after discussing these controversies.    All diagnosis above reviewed and noted above, otherwise stable.      See [email or web address] orders for further details.      1) labs pending    2) Tdap, shingrix    3) consider COVID, Flu, Twinrix    4) colonoscopy    Return for Complete Physical, Medication Recheck Visit, Follow Up Chronic.    Health Maintenance Due   Topic Date Due     HEPATITIS B IMMUNIZATION (1 of 3 - 3-dose series) Never done     COLORECTAL CANCER SCREENING  03/09/2018     COVID-19 Vaccine (4 - Booster for Pfizer series) 02/07/2022     LIPID  03/01/2022     INFLUENZA VACCINE (1) Never done       COUNSELING    Reviewed preventive health counseling, as reflected in patient instructions    BP Readings from Last 1 Encounters:   01/04/23 128/78     Estimated body mass index is 35.44 kg/m  as calculated from the following:    Height as of this encounter: 1.778 m (5' 10\").    Weight as of this encounter: 112 kg (247 lb).      Weight management plan: diet and exercise     reports that he has never smoked. He has never used smokeless tobacco.      Counseling Resources:    ATP IV Guidelines  Pooled Cohorts Equation Calculator  FRAX Risk Assessment  ICSI Preventive Guidelines  Dietary Guidelines for Americans, 2010  USDA's MyPlate  ASA Prophylaxis  Lung CA Screening           Yandel Novoa MD, FAAFP     [facility] " [address] [address]  [email or web address]  [email or web address]   Office: [phone]  Fax: [phone]  Pager: [phone]

## 2023-01-04 NOTE — LETTER
January 26, 2023      Bebo Lamb  [address] [facility] [address]        Dear Bebo,     Your recent results have been reviewed.     They showed:     Labs are overall quite good, except     Very high lipids  Borderline elevated glucose  Elevated uric acid      We advise:     Continue current cares.  Balanced low cholesterol, low purine diet.  Regular exercise.  Weight loss.     Rosuvastatin 10 mg daily (#90, r x 3) - sent to [facility] pharmacy.      Recheck fasting labs in 3-4 months (cmp, uric acid, a1c, lipid reflex, ck) - orders placed, please schedule lab only appointment.      For additional lab test information, [email or web address] is an excellent reference.     Let us know if you have any questions or concerns.     Thank you for choosing us for your health care needs!     Sincerely,        Yandel Novoa MD, FAAFP     [address]  [address] [phone] Fax    Resulted Orders   Comprehensive metabolic panel   Result Value Ref Range    Sodium 142 136 - 145 mmol/L    Potassium 4.4 3.4 - 5.3 mmol/L    Chloride 103 98 - 107 mmol/L    Carbon Dioxide (CO2) 25 22 - 29 mmol/L    Anion Gap 14 7 - 15 mmol/L    Urea Nitrogen 17.6 6.0 - 20.0 mg/dL    Creatinine 0.94 0.67 - 1.17 mg/dL    Calcium 9.3 8.6 - 10.0 mg/dL    Glucose 102 (H) 70 - 99 mg/dL    Alkaline Phosphatase 48 40 - 129 U/L    AST 24 10 - 50 U/L    ALT 42 10 - 50 U/L    Protein Total 7.1 6.4 - 8.3 g/dL    Albumin 4.5 3.5 - 5.2 g/dL    Bilirubin Total 0.5 <=1.2 mg/dL    GFR Estimate >90 >60 mL/min/1.73m2      Comment:      Effective December 21, 2021 eGFRcr in adults is calculated using the 2021 CKD-EPI creatinine equation which includes age and gender (Marco dickinson al., NEJ, DOI: 10.1056/WROHqe1563833)   Lipid panel reflex to direct LDL Fasting   Result Value Ref Range    Cholesterol 292 (H) <200 mg/dL    Triglycerides 200 (H) <150 mg/dL    Direct Measure HDL 47  >=40 mg/dL    LDL Cholesterol Calculated 205 (H) <=100 mg/dL    Non HDL Cholesterol 245 (H) <130 mg/dL    Narrative    Cholesterol  Desirable:  <200 mg/dL    Triglycerides  Normal:  Less than 150 mg/dL  Borderline High:  150-199 mg/dL  High:  200-499 mg/dL  Very High:  Greater than or equal to 500 mg/dL    Direct Measure HDL  Female:  Greater than or equal to 50 mg/dL   Male:  Greater than or equal to 40 mg/dL    LDL Cholesterol  Desirable:  <100mg/dL  Above Desirable:  100-129 mg/dL   Borderline High:  130-159 mg/dL   High:  160-189 mg/dL   Very High:  >= 190 mg/dL    Non HDL Cholesterol  Desirable:  130 mg/dL  Above Desirable:  130-159 mg/dL  Borderline High:  160-189 mg/dL  High:  190-219 mg/dL  Very High:  Greater than or equal to 220 mg/dL   CBC with platelets   Result Value Ref Range    WBC Count 5.3 4.0 - 11.0 10e3/uL    RBC Count 4.97 4.40 - 5.90 10e6/uL    Hemoglobin 15.2 13.3 - 17.7 g/dL    Hematocrit 44.7 40.0 - 53.0 %    MCV 90 78 - 100 fL    MCH 30.6 26.5 - 33.0 pg    MCHC 34.0 31.5 - 36.5 g/dL    RDW 12.7 10.0 - 15.0 %    Platelet Count 231 150 - 450 10e3/uL   CK total   Result Value Ref Range    CK 99 39 - 308 U/L   UA reflex to Microscopic and Culture   Result Value Ref Range    Color Urine Yellow Colorless, Straw, Light Yellow, Yellow    Appearance Urine Clear Clear    Glucose Urine Negative Negative mg/dL    Bilirubin Urine Negative Negative    Ketones Urine Negative Negative mg/dL    Specific Gravity Urine >=1.030 1.003 - 1.035    Blood Urine Negative Negative    pH Urine 5.5 5.0 - 7.0    Protein Albumin Urine Negative Negative mg/dL    Urobilinogen Urine 0.2 0.2, 1.0 E.U./dL    Nitrite Urine Negative Negative    Leukocyte Esterase Urine Negative Negative    Narrative    Microscopic not indicated   Albumin Random Urine Quantitative with Creat Ratio   Result Value Ref Range    Albumin Urine mg/L <12.0 mg/L      Comment:      The reference ranges have not been established in urine albumin. The  results should be integrated into the clinical context for interpretation.    Albumin Urine mg/g Cr        Comment:      Unable to calculate, urine albumin and/or urine creatinine is outside detectable limits.  Microalbuminuria is defined as an albumin:creatinine ratio of 17 to 299 for males and 25 to 299 for females. A ratio of albumin:creatinine of 300 or higher is indicative of overt proteinuria.  Due to biologic variability, positive results should be confirmed by a second, first-morning random or 24-hour timed urine specimen. If there is discrepancy, a third specimen is recommended. When 2 out of 3 results are in the microalbuminuria range, this is evidence for incipient nephropathy and warrants increased efforts at glucose control, blood pressure control, and institution of therapy with an angiotensin-converting-enzyme (ACE) inhibitor (if the patient can tolerate it).      Creatinine Urine mg/dL 246.0 mg/dL      Comment:      The reference ranges have not been established in urine creatinine. The results should be integrated into the clinical context for interpretation.   TSH with free T4 reflex   Result Value Ref Range    TSH 2.47 0.30 - 4.20 uIU/mL   Prostate Specific Antigen Screen   Result Value Ref Range    Prostate Specific Antigen Screen 0.39 0.00 - 3.50 ng/mL    Narrative    This result is obtained using the Roche Elecsys total PSA method on the remy e801 immunoassay analyzer. Results obtained with different assay methods or kits cannot be used interchangeably.   Uric acid   Result Value Ref Range    Uric Acid 7.9 (H) 3.4 - 7.0 mg/dL       If you have any questions or concerns, please call the clinic at the number listed above.       Sincerely,      Yandel Novoa MD

## 2023-01-26 RX ORDER — ROSUVASTATIN CALCIUM 10 MG/1
10 TABLET, COATED ORAL DAILY
Qty: 90 TABLET | Refills: 3 | Status: SHIPPED | OUTPATIENT
Start: 2023-01-26 | End: 2024-04-17

## 2023-04-04 ENCOUNTER — TELEPHONE (OUTPATIENT)
Dept: GASTROENTEROLOGY | Facility: CLINIC | Age: 53
End: 2023-04-04
Payer: COMMERCIAL

## 2023-04-04 NOTE — TELEPHONE ENCOUNTER
Screening Questions  BLUE  KIND OF PREP RED  LOCATION [review exclusion criteria] GREEN  SEDATION TYPE        Y Are you active on mychart?       Yandel Novoa Ordering/Referring Provider?        [facility] What type of coverage do you have?      N Have you had a positive covid test in the last 14 days?     35.4 1. BMI  [BMI 40+ - review exclusion criteria]    Y  2. Are you able to give consent for your medical care? [IF NO,RN REVIEW]          N  3. Are you taking any prescription pain medications on a routine schedule   (ex narcotics: oxycodone, roxicodone, oxycontin,  and percocet)? [RN Review]        N  3a. EXTENDED PREP What kind of prescription?     N 4. Do you have any chemical dependencies such as alcohol, street drugs, or methadone?        **If yes 3- 5 , please schedule with MAC sedation.**          IF YES TO ANY 6 - 10 - HOSPITAL SETTING ONLY.     N 6.   Do you need assistance transferring?     N 7.   Have you had a heart or lung transplant?    N 8.   Are you currently on dialysis?   N 9.   Do you use daily home oxygen?   N 10. Do you take nitroglycerin?   10a. N If yes, how often?     11. [FEMALES]  N Are you currently pregnant?    11a. N If yes, how many weeks? [ Greater than 12 weeks, OR NEEDED]    N 12. Do you have Pulmonary Hypertension? *NEED PAC APPT AT [facility] w/ [facility]*     N 13. [review exclusion criteria]  Do you have any implantable devices in your body (pacemaker, defib, LVAD)?    N 14. In the past 6 months, have you had any heart related issues including cardiomyopathy or heart attack?     14a. N If yes, did it require cardiac stenting if so when?     N 15. Have you had a stroke or Transient ischemic attack (TIA - aka  mini stroke ) within 6 months?      N 16. Do you have mod to severe Obstructive Sleep Apnea?  [Hospital only]    N 17. Do you have SEVERE AND UNCONTROLLED asthma? *NEED PAC APPT AT [facility] w/MAC*     18. Are you currently taking any blood thinners?     18a. No. Continue to 19.   18b. Yes/no  "Blood Thinner: No [CONTINUE TO #19]    N 19. Do you take the medication Phentermine?    19a. If yes, \"Hold for 7 days before procedure.  Please consult your prescribing provider if you have questions about holding this medication.\"     N  20. Do you have chronic kidney disease?      N  21. Do you have a diagnosis of diabetes?     N  22. On a regular basis do you go 3-5 days between bowel movements?     N 23. Preferred LOCAL Pharmacy for Pre Prescription    [ LIST ONLY ONE PHARMACY]     [facility] - [address]        - CLOSING REMINDERS -    Informed patient they will need an adult    Cannot take any type of public or medical transportation alone    Conscious Sedation- Needs  for 6 hours after the procedure       MAC/General-Needs  for 24 hours after procedure    Pre-Procedure Covid test to be completed [[facility] PCR Testing Required]    Confirmed Nurse will call to complete assessment       - SCHEDULING DETAILS -  N Hospital Setting Required? If yes, what is the exclusion?: TIA GOODMAN  Surgeon    7/7  Date of Procedure  Lower Endoscopy [Colonoscopy]  Type of Procedure Scheduled  [facility] Location   MIRALAX GATORADE WITHOUT MAGNEISUM CITRATE Which Colonoscopy Prep was Sent?     MODERATE Sedation Type     N PAC / Pre-op Required                 "

## 2023-07-07 ENCOUNTER — HOSPITAL ENCOUNTER (OUTPATIENT)
Facility: CLINIC | Age: 53
Discharge: HOME | End: 2023-07-07
Attending: INTERNAL MEDICINE | Admitting: INTERNAL MEDICINE
Payer: COMMERCIAL

## 2023-07-07 VITALS
RESPIRATION RATE: 12 BRPM | TEMPERATURE: 97.8 F | BODY MASS INDEX: 34.5 KG/M2 | OXYGEN SATURATION: 92 % | SYSTOLIC BLOOD PRESSURE: 131 MMHG | DIASTOLIC BLOOD PRESSURE: 91 MMHG | HEIGHT: 70 IN | WEIGHT: 241 LBS | HEART RATE: 87 BPM

## 2023-07-07 LAB — COLONOSCOPY: NORMAL

## 2023-07-07 PROCEDURE — 45380 COLONOSCOPY AND BIOPSY: CPT | Performed by: INTERNAL MEDICINE

## 2023-07-07 PROCEDURE — G0500 MOD SEDAT ENDO SERVICE >5YRS: HCPCS | Performed by: INTERNAL MEDICINE

## 2023-07-07 PROCEDURE — 250N000013 HC RX MED GY IP 250 OP 250 PS 637: Performed by: INTERNAL MEDICINE

## 2023-07-07 PROCEDURE — 88305 TISSUE EXAM BY PATHOLOGIST: CPT | Mod: TC | Performed by: INTERNAL MEDICINE

## 2023-07-07 PROCEDURE — 45385 COLONOSCOPY W/LESION REMOVAL: CPT | Mod: PT

## 2023-07-07 PROCEDURE — 250N000011 HC RX IP 250 OP 636: Performed by: INTERNAL MEDICINE

## 2023-07-07 RX ORDER — FENTANYL CITRATE 50 UG/ML
50-100 INJECTION, SOLUTION INTRAMUSCULAR; INTRAVENOUS EVERY 5 MIN PRN
Status: DISCONTINUED | OUTPATIENT
Start: 2023-07-07 | End: 2023-07-07 | Stop reason: HOSPADM

## 2023-07-07 RX ORDER — ONDANSETRON 2 MG/ML
4 INJECTION INTRAMUSCULAR; INTRAVENOUS
Status: DISCONTINUED | OUTPATIENT
Start: 2023-07-07 | End: 2023-07-07 | Stop reason: HOSPADM

## 2023-07-07 RX ORDER — NALOXONE HYDROCHLORIDE 0.4 MG/ML
0.2 INJECTION, SOLUTION INTRAMUSCULAR; INTRAVENOUS; SUBCUTANEOUS
Status: DISCONTINUED | OUTPATIENT
Start: 2023-07-07 | End: 2023-07-07 | Stop reason: HOSPADM

## 2023-07-07 RX ORDER — ATROPINE SULFATE 0.1 MG/ML
1 INJECTION INTRAVENOUS
Status: DISCONTINUED | OUTPATIENT
Start: 2023-07-07 | End: 2023-07-07 | Stop reason: HOSPADM

## 2023-07-07 RX ORDER — EPINEPHRINE 1 MG/ML
0.1 INJECTION, SOLUTION INTRAMUSCULAR; SUBCUTANEOUS
Status: DISCONTINUED | OUTPATIENT
Start: 2023-07-07 | End: 2023-07-07 | Stop reason: HOSPADM

## 2023-07-07 RX ORDER — SIMETHICONE 40MG/0.6ML
133 SUSPENSION, DROPS(FINAL DOSAGE FORM)(ML) ORAL
Status: COMPLETED | OUTPATIENT
Start: 2023-07-07 | End: 2023-07-07

## 2023-07-07 RX ORDER — FLUMAZENIL 0.1 MG/ML
0.2 INJECTION, SOLUTION INTRAVENOUS
Status: DISCONTINUED | OUTPATIENT
Start: 2023-07-07 | End: 2023-07-07 | Stop reason: HOSPADM

## 2023-07-07 RX ORDER — NALOXONE HYDROCHLORIDE 0.4 MG/ML
0.4 INJECTION, SOLUTION INTRAMUSCULAR; INTRAVENOUS; SUBCUTANEOUS
Status: DISCONTINUED | OUTPATIENT
Start: 2023-07-07 | End: 2023-07-07 | Stop reason: HOSPADM

## 2023-07-07 RX ORDER — ONDANSETRON 4 MG/1
4 TABLET, ORALLY DISINTEGRATING ORAL EVERY 6 HOURS PRN
Status: DISCONTINUED | OUTPATIENT
Start: 2023-07-07 | End: 2023-07-07 | Stop reason: HOSPADM

## 2023-07-07 RX ORDER — PROCHLORPERAZINE MALEATE 10 MG
10 TABLET ORAL EVERY 6 HOURS PRN
Status: DISCONTINUED | OUTPATIENT
Start: 2023-07-07 | End: 2023-07-07 | Stop reason: HOSPADM

## 2023-07-07 RX ORDER — DIPHENHYDRAMINE HYDROCHLORIDE 50 MG/ML
25-50 INJECTION INTRAMUSCULAR; INTRAVENOUS
Status: DISCONTINUED | OUTPATIENT
Start: 2023-07-07 | End: 2023-07-07 | Stop reason: HOSPADM

## 2023-07-07 RX ORDER — ONDANSETRON 2 MG/ML
4 INJECTION INTRAMUSCULAR; INTRAVENOUS EVERY 6 HOURS PRN
Status: DISCONTINUED | OUTPATIENT
Start: 2023-07-07 | End: 2023-07-07 | Stop reason: HOSPADM

## 2023-07-07 RX ORDER — LIDOCAINE 40 MG/G
CREAM TOPICAL
Status: DISCONTINUED | OUTPATIENT
Start: 2023-07-07 | End: 2023-07-07 | Stop reason: HOSPADM

## 2023-07-07 RX ADMIN — SIMETHICONE 133 MG: 20 EMULSION ORAL at 08:48

## 2023-07-07 RX ADMIN — MIDAZOLAM 2 MG: 1 INJECTION INTRAMUSCULAR; INTRAVENOUS at 08:41

## 2023-07-07 RX ADMIN — FENTANYL CITRATE 100 MCG: 50 INJECTION, SOLUTION INTRAMUSCULAR; INTRAVENOUS at 08:41

## 2023-07-07 ASSESSMENT — ACTIVITIES OF DAILY LIVING (ADL): ADLS_ACUITY_SCORE: 35

## 2023-07-07 NOTE — PRE-PROCEDURE
Pre-Endoscopy History and Physical     Bebo Lamb MRN# [id number]   YOB: 1970 Age: 53 year old     Date of Procedure: 7/7/2023  Primary care provider: Yandel Novoa  Type of Endoscopy: colonoscopy  Reason for Procedure: screening  Type of Anesthesia Anticipated: Moderate (conscious) sedation    HPI:    Bebo is a 53 year old male who will be undergoing the above procedure.      A history and physical has been performed. The patient's medications and allergies have been reviewed. The risks and benefits of the procedure and the sedation options and risks were discussed with the patient.  All questions were answered and informed consent was obtained.      No Known Allergies     Current Facility-Administered Medications   Medication     0.9% sodium chloride BOLUS     atropine injection 1 mg     benzocaine 20% (HURRICAINE/TOPEX) 20 % spray 0.5 mL     diphenhydrAMINE (BENADRYL) injection 25-50 mg     EPINEPHrine (Anaphylaxis) (ADRENALIN) injection (vial) 0.1 mg     fentaNYL (PF) (SUBLIMAZE) injection  mcg     flumazenil (ROMAZICON) injection 0.2 mg     glucagon injection 0.5 mg     lidocaine (LMX4) cream     lidocaine 1 % 0.1-1 mL     midazolam (VERSED) injection 0.5-2 mg     naloxone (NARCAN) injection 0.2 mg    Or     naloxone (NARCAN) injection 0.4 mg    Or     naloxone (NARCAN) injection 0.2 mg    Or     naloxone (NARCAN) injection 0.4 mg     ondansetron (ZOFRAN) injection 4 mg     simethicone (MYLICON) suspension 133 mg     sodium chloride (PF) 0.9% PF flush 3 mL     sodium chloride (PF) 0.9% PF flush 3 mL     sodium chloride (PF) 0.9% PF flush 3 mL       Patient Active Problem List   Diagnosis     Gout     Hyperlipidemia LDL goal <130     Morbid obesity (H)        Past Medical History:   Diagnosis Date     Gout      Hyperlipidemia LDL goal <130         Past Surgical History:   Procedure Laterality Date     SURGICAL HISTORY OF -   01/2009    Rt tib/fib Fx with ORIF       Social History  "    Tobacco Use     Smoking status: Never     Smokeless tobacco: Never   Substance Use Topics     Alcohol use: Yes     Alcohol/week: 8.3 standard drinks of alcohol     Types: 10 Standard drinks or equivalent per week     Comment: 1.5 drinks per day avg       Family History   Problem Relation Age of Onset     Prostate Cancer Father      Prostate Cancer Paternal Grandfather      Neurologic Disorder Paternal Grandfather         Parkinson's     Prostate Cancer Brother      Cancer Brother         non-Hodgkins lymphoma     Colon Cancer No family hx of             Medications:     Medications Prior to Admission   Medication Sig Dispense Refill Last Dose     rosuvastatin (CRESTOR) 10 MG tablet Take 1 tablet (10 mg) by mouth daily 90 tablet 3        Scheduled Medications:    sodium chloride (PF)  3 mL Intracatheter Q8H       PRN:  sodium chloride 0.9%, atropine, benzocaine 20%, diphenhydrAMINE, EPINEPHrine, fentaNYL, flumazenil, glucagon, lidocaine 4%, lidocaine (buffered or not buffered), midazolam, naloxone **OR** naloxone **OR** naloxone **OR** naloxone, ondansetron, simethicone, sodium chloride (PF), sodium chloride (PF)    PHYSICAL EXAM:   BP (!) 137/90   Pulse 84   Temp 97.8  F (36.6  C) (Temporal)   Resp 12   Ht 1.778 m (5' 10\")   Wt 109.3 kg (241 lb)   SpO2 97%   BMI 34.58 kg/m   Estimated body mass index is 34.58 kg/m  as calculated from the following:    Height as of this encounter: 1.778 m (5' 10\").    Weight as of this encounter: 109.3 kg (241 lb).   RESP: lungs clear to auscultation - no rales, rhonchi or wheezes  CV: regular rates and rhythm    IMPRESSION   ASA Class 2 - Mild systemic disease      Signed Electronically by: Gato Kimball MD  July 7, 2023    .            "

## 2023-07-09 PROBLEM — Z86.0100 HISTORY OF COLONIC POLYPS: Status: ACTIVE | Noted: 2023-06-01

## 2023-07-10 LAB
PATH REPORT.COMMENTS IMP SPEC: NORMAL
PATH REPORT.COMMENTS IMP SPEC: NORMAL
PATH REPORT.FINAL DX SPEC: NORMAL
PATH REPORT.GROSS SPEC: NORMAL
PATH REPORT.MICROSCOPIC SPEC OTHER STN: NORMAL
PATH REPORT.RELEVANT HX SPEC: NORMAL
PHOTO IMAGE: NORMAL

## 2023-07-10 PROCEDURE — 88305 TISSUE EXAM BY PATHOLOGIST: CPT | Mod: 26 | Performed by: PATHOLOGY

## 2023-11-01 ENCOUNTER — PATIENT OUTREACH (OUTPATIENT)
Dept: GASTROENTEROLOGY | Facility: CLINIC | Age: 53
End: 2023-11-01
Payer: COMMERCIAL

## 2024-02-18 ENCOUNTER — HEALTH MAINTENANCE LETTER (OUTPATIENT)
Age: 54
End: 2024-02-18

## 2024-04-17 ENCOUNTER — OFFICE VISIT (OUTPATIENT)
Dept: FAMILY MEDICINE | Facility: CLINIC | Age: 54
End: 2024-04-17
Payer: COMMERCIAL

## 2024-04-17 VITALS
WEIGHT: 251 LBS | HEIGHT: 70 IN | DIASTOLIC BLOOD PRESSURE: 82 MMHG | RESPIRATION RATE: 14 BRPM | BODY MASS INDEX: 35.93 KG/M2 | OXYGEN SATURATION: 96 % | TEMPERATURE: 98.7 F | SYSTOLIC BLOOD PRESSURE: 132 MMHG | HEART RATE: 89 BPM

## 2024-04-17 DIAGNOSIS — M10.09 IDIOPATHIC GOUT OF MULTIPLE SITES, UNSPECIFIED CHRONICITY: ICD-10-CM

## 2024-04-17 DIAGNOSIS — E78.5 HYPERLIPIDEMIA LDL GOAL <130: ICD-10-CM

## 2024-04-17 DIAGNOSIS — Z51.81 MEDICATION MONITORING ENCOUNTER: ICD-10-CM

## 2024-04-17 DIAGNOSIS — Z23 NEED FOR SHINGLES VACCINE: ICD-10-CM

## 2024-04-17 DIAGNOSIS — E66.01 MORBID OBESITY (H): ICD-10-CM

## 2024-04-17 DIAGNOSIS — Z12.11 SCREEN FOR COLON CANCER: ICD-10-CM

## 2024-04-17 DIAGNOSIS — Z00.00 ROUTINE GENERAL MEDICAL EXAMINATION AT A HEALTH CARE FACILITY: Primary | ICD-10-CM

## 2024-04-17 DIAGNOSIS — Z86.0100 HISTORY OF COLONIC POLYPS: ICD-10-CM

## 2024-04-17 DIAGNOSIS — Z12.5 SCREENING FOR PROSTATE CANCER: ICD-10-CM

## 2024-04-17 PROCEDURE — 90471 IMMUNIZATION ADMIN: CPT | Performed by: FAMILY MEDICINE

## 2024-04-17 PROCEDURE — 99396 PREV VISIT EST AGE 40-64: CPT | Mod: 25 | Performed by: FAMILY MEDICINE

## 2024-04-17 PROCEDURE — 99213 OFFICE O/P EST LOW 20 MIN: CPT | Mod: 25 | Performed by: FAMILY MEDICINE

## 2024-04-17 PROCEDURE — 90750 HZV VACC RECOMBINANT IM: CPT | Performed by: FAMILY MEDICINE

## 2024-04-17 RX ORDER — ROSUVASTATIN CALCIUM 10 MG/1
10 TABLET, COATED ORAL DAILY
Qty: 90 TABLET | Refills: 3 | Status: SHIPPED | OUTPATIENT
Start: 2024-04-17

## 2024-04-17 SDOH — HEALTH STABILITY: PHYSICAL HEALTH: ON AVERAGE, HOW MANY MINUTES DO YOU ENGAGE IN EXERCISE AT THIS LEVEL?: 30 MIN

## 2024-04-17 SDOH — HEALTH STABILITY: PHYSICAL HEALTH: ON AVERAGE, HOW MANY DAYS PER WEEK DO YOU ENGAGE IN MODERATE TO STRENUOUS EXERCISE (LIKE A BRISK WALK)?: 3 DAYS

## 2024-04-17 ASSESSMENT — SOCIAL DETERMINANTS OF HEALTH (SDOH): HOW OFTEN DO YOU GET TOGETHER WITH FRIENDS OR RELATIVES?: ONCE A WEEK

## 2024-04-17 ASSESSMENT — PAIN SCALES - GENERAL: PAINLEVEL: NO PAIN (0)

## 2024-04-17 NOTE — PROGRESS NOTES
Preventive Care Visit  [facility] [place]  Yandel Novoa MD, Family Medicine  Apr 17, 2024      Assessment & Plan     Routine general medical examination at a health care facility    - Comprehensive metabolic panel  - Lipid panel reflex to direct LDL Fasting  - CBC with platelets  - CK total  - UA Macroscopic with reflex to Microscopic and Culture  - Albumin Random Urine Quantitative with Creat Ratio  - TSH with free T4 reflex  - Prostate Specific Antigen Screen  - Fecal colorectal cancer screen FIT  - Uric acid  - REVIEW OF HEALTH MAINTENANCE PROTOCOL ORDERS  - PRIMARY CARE FOLLOW-UP SCHEDULING  - REVIEW OF HEALTH MAINTENANCE PROTOCOL ORDERS    Hyperlipidemia LDL goal <130    - Comprehensive metabolic panel  - Lipid panel reflex to direct LDL Fasting  - CK total  - REVIEW OF HEALTH MAINTENANCE PROTOCOL ORDERS  - PRIMARY CARE FOLLOW-UP SCHEDULING  - REVIEW OF HEALTH MAINTENANCE PROTOCOL ORDERS  - rosuvastatin (CRESTOR) 10 MG tablet  Dispense: 90 tablet; Refill: 3    Idiopathic gout of multiple sites, unspecified chronicity    - Comprehensive metabolic panel  - Uric acid  - REVIEW OF HEALTH MAINTENANCE PROTOCOL ORDERS  - PRIMARY CARE FOLLOW-UP SCHEDULING  - REVIEW OF HEALTH MAINTENANCE PROTOCOL ORDERS    History of colonic polyps    - Fecal colorectal cancer screen FIT  - REVIEW OF HEALTH MAINTENANCE PROTOCOL ORDERS  - PRIMARY CARE FOLLOW-UP SCHEDULING  - REVIEW OF HEALTH MAINTENANCE PROTOCOL ORDERS    Screen for colon cancer    - Fecal colorectal cancer screen FIT  - REVIEW OF HEALTH MAINTENANCE PROTOCOL ORDERS  - PRIMARY CARE FOLLOW-UP SCHEDULING  - REVIEW OF HEALTH MAINTENANCE PROTOCOL ORDERS    Screening for prostate cancer    - Prostate Specific Antigen Screen  - REVIEW OF HEALTH MAINTENANCE PROTOCOL ORDERS  - PRIMARY CARE FOLLOW-UP SCHEDULING  - REVIEW OF HEALTH MAINTENANCE PROTOCOL ORDERS    Medication monitoring encounter    - Comprehensive metabolic panel  - Lipid panel reflex to direct LDL  "Fasting  - CBC with platelets  - CK total  - UA Macroscopic with reflex to Microscopic and Culture  - Albumin Random Urine Quantitative with Creat Ratio  - TSH with free T4 reflex  - Uric acid  - REVIEW OF HEALTH MAINTENANCE PROTOCOL ORDERS  - PRIMARY CARE FOLLOW-UP SCHEDULING  - REVIEW OF HEALTH MAINTENANCE PROTOCOL ORDERS    Morbid obesity (H)    - rosuvastatin (CRESTOR) 10 MG tablet  Dispense: 90 tablet; Refill: 3    Need for shingles vaccine    - ZOSTER RECOMBINANT ADJUVANTED (SHINGRIX)    Patient has been advised of split billing requirements and indicates understanding: Yes    Prescription drug management    28 minutes spent by me on the date of the encounter doing chart review, history and exam, documentation and further activities per the note    BMI  Estimated body mass index is 36.01 kg/m  as calculated from the following:    Height as of this encounter: 1.778 m (5' 10\").    Weight as of this encounter: 113.9 kg (251 lb).   Weight management plan: diet and exercise    Counseling  Appropriate preventive services were discussed with this patient, including applicable screening as appropriate for fall prevention, nutrition, physical activity, Tobacco-use cessation, weight loss and cognition.  Checklist reviewing preventive services available has been given to the patient.  Reviewed patient's diet, addressing concerns and/or questions.   He is at risk for lack of exercise and has been provided with information to increase physical activity for the benefit of his well-being.   The patient was instructed to see the dentist every 6 months.   He is at risk for psychosocial distress and has been provided with information to reduce risk.       Work on weight loss  Regular exercise    Plan:    1) Medications: restart rosuvastatin, refilled    2) Labs: due in 2-3 months fasting    3) Immunizations: reviewed, advised, desires shingrix    4) Imaging/Diagnostics: NA    5) Consults: NA    Dawn   Bebo is a 54 year " old, presenting for the following:  Physical        4/17/2024     4:13 PM   Additional Questions   Roomed by CHING CASTANO   Accompanied by SELF      Hyperlipidemia Follow-Up    Are you regularly taking any medication or supplement to lower your cholesterol?   NO - pt states he couldn't get it   Are you having muscle aches or other side effects that you think could be caused by your cholesterol lowering medication?  No    Recent Labs   Lab Test 01/04/23  0848 03/01/17  0802   CHOL 292* 289*   HDL 47 55   * 197*   TRIG 200* 187*     Gout - no issues in > 6 months        4/17/2024   General Health   How would you rate your overall physical health? (!) FAIR   Feel stress (tense, anxious, or unable to sleep) Only a little   (!) STRESS CONCERN      4/17/2024   Nutrition   Three or more servings of calcium each day? Yes   Diet: Regular (no restrictions)   How many servings of fruit and vegetables per day? (!) 2-3   How many sweetened beverages each day? 0-1         4/17/2024   Exercise   Days per week of moderate/strenous exercise 3 days   Average minutes spent exercising at this level 30 min         4/17/2024   Social Factors   Frequency of gathering with friends or relatives Once a week   Worry food won't last until get money to buy more No   Food not last or not have enough money for food? No   Do you have housing?  Yes   Are you worried about losing your housing? No   Lack of transportation? No   Unable to get utilities (heat,electricity)? No         4/17/2024   Fall Risk   Fallen 2 or more times in the past year? No   Trouble with walking or balance? No          4/17/2024   Dental   Dentist two times every year? (!) NO         4/17/2024   TB Screening   Were you born outside of the [place]? No         Today's PHQ-2 Score:       4/17/2024    10:56 AM   PHQ-2 ( 1999 [employer])   Q1: Little interest or pleasure in doing things 0   Q2: Feeling down, depressed or hopeless 0   PHQ-2 Score 0   Q1: Little interest or pleasure in  doing things Not at all   Q2: Feeling down, depressed or hopeless Not at all   PHQ-2 Score 0           2024   Substance Use   Alcohol more than 3/day or more than 7/wk No   Do you use any other substances recreationally? No     Social History     Tobacco Use    Smoking status: Never    Smokeless tobacco: Never   Vaping Use    Vaping status: Never Used   Substance Use Topics    Alcohol use: Yes     Alcohol/week: 8.3 standard drinks of alcohol     Types: 10 Standard drinks or equivalent per week     Comment: 1.5 drinks per day avg    Drug use: No           2024   STI Screening   New sexual partner(s) since last STI/HIV test? No     ASCVD Risk   The 10-year ASCVD risk score (Luisana MCCRAY, et al., 2019) is: 8.3%    Values used to calculate the score:      Age: 54 years      Sex: Male      Is Non- : No      Diabetic: No      Tobacco smoker: No      Systolic Blood Pressure: 132 mmHg      Is BP treated: No      HDL Cholesterol: 47 mg/dL      Total Cholesterol: 292 mg/dL    Fracture Risk Assessment Tool  Link to Frax Calculator  Use the information below to complete the Frax calculator  : 1970  Sex: male  Weight (kg): 113.9 kg (actual weight)  Height (cm): 177.8 cm  Previous Fragility Fracture:  No  History of parent with fractured hip:  No  Current Smoking:  No  Patient has been on glucocorticoids for more than 3 months (5mg/day or more): No  Rheumatoid Arthritis on Problem List:  No  Secondary Osteoporosis on Problem List:  No  Consumes 3 or more units of alcohol per day: No  Femoral Neck BMD (g/cm2)           Reviewed and updated as needed this visit by Provider                  Patient Active Problem List   Diagnosis    Gout    Hyperlipidemia LDL goal <130    Morbid obesity (H)    History of colonic polyps       Past Medical History:   Diagnosis Date    Gout     History of colonic polyps 2023    Hyperlipidemia LDL goal <130        Past Surgical History:   Procedure  Laterality Date    COLONOSCOPY N/A 07/07/2023    polyp x 1 - Tubular adenoma - Dr Kimball - Due 5 yrs    SURGICAL HISTORY OF -   01/2009    Rt tib/fib Fx with ORIF       Current Outpatient Medications   Medication Sig Dispense Refill    rosuvastatin (CRESTOR) 10 MG tablet Take 1 tablet (10 mg) by mouth daily 90 tablet 3       No Known Allergies    Family History   Problem Relation Age of Onset    Prostate Cancer Father 50    Prostate Cancer Brother 55    Cancer Brother         non-Hodgkins lymphoma    Prostate Cancer Paternal Grandfather     Neurologic Disorder Paternal Grandfather         Parkinson's    Colon Cancer No family hx of        Social History     Socioeconomic History    Marital status:      Spouse name: Maryam Morales    Number of children: 0    Years of education: 16    Highest education level: None   Tobacco Use    Smoking status: Never    Smokeless tobacco: Never   Vaping Use    Vaping status: Never Used   Substance and Sexual Activity    Alcohol use: Yes     Alcohol/week: 8.3 standard drinks of alcohol     Types: 10 Standard drinks or equivalent per week     Comment: 1.5 drinks per day avg    Drug use: No    Sexual activity: Yes     Partners: Female     Birth control/protection: None   Other Topics Concern    Parent/sibling w/ CABG, MI or angioplasty before 65F 55M? No    Caffeine Concern Yes     Comment: 2 cans daily    Exercise Yes     Comment: occas    Seat Belt Yes     Social Determinants of Health     Financial Resource Strain: Low Risk  (4/17/2024)    Financial Resource Strain     Within the past 12 months, have you or your family members you live with been unable to get utilities (heat, electricity) when it was really needed?: No   Food Insecurity: Low Risk  (4/17/2024)    Food Insecurity     Within the past 12 months, did you worry that your food would run out before you got money to buy more?: No     Within the past 12 months, did the food you bought just not last and you didn t have  money to get more?: No   Transportation Needs: Low Risk  (4/17/2024)    Transportation Needs     Within the past 12 months, has lack of transportation kept you from medical appointments, getting your medicines, non-medical meetings or appointments, work, or from getting things that you need?: No   Physical Activity: Insufficiently Active (4/17/2024)    Exercise Vital Sign     Days of Exercise per Week: 3 days     Minutes of Exercise per Session: 30 min   Stress: No Stress Concern Present (4/17/2024)    [employer] [place] [employer] - Occupational Stress Questionnaire     Feeling of Stress : Only a little   Social Connections: Unknown (4/17/2024)    Social Connection and Isolation Panel [NHANES]     Frequency of Social Gatherings with Friends and Family: Once a week   Interpersonal Safety: Low Risk  (4/17/2024)    Interpersonal Safety     Do you feel physically and emotionally safe where you currently live?: Yes     Within the past 12 months, have you been hit, slapped, kicked or otherwise physically hurt by someone?: No     Within the past 12 months, have you been humiliated or emotionally abused in other ways by your partner or ex-partner?: No   Housing Stability: Low Risk  (4/17/2024)    Housing Stability     Do you have housing? : Yes     Are you worried about losing your housing?: No     Colonoscopy:  due 7/2028  FIT / Cologuard: ordered  PSA: ordered      Review of Systems  CONSTITUTIONAL: NEGATIVE for fever, chills, change in weight  INTEGUMENTARY/SKIN: NEGATIVE for worrisome rashes, moles or lesions  EYES: NEGATIVE for vision changes or irritation  ENT/MOUTH: NEGATIVE for ear, mouth and throat problems  RESP: NEGATIVE for significant cough or SOB  CV: NEGATIVE for chest pain, palpitations or peripheral edema  GI: NEGATIVE for nausea, abdominal pain, heartburn, or change in bowel habits  : NEGATIVE for frequency, dysuria, or hematuria  MUSCULOSKELETAL: NEGATIVE for significant arthralgias or  "myalgia  NEURO: NEGATIVE for weakness, dizziness or paresthesias  ENDOCRINE: NEGATIVE for temperature intolerance, skin/hair changes  HEME: NEGATIVE for bleeding problems  PSYCHIATRIC: NEGATIVE for changes in mood or affect     Objective    Exam  /82   Pulse 89   Temp 98.7  F (37.1  C) (Tympanic)   Resp 14   Ht 1.778 m (5' 10\")   Wt 113.9 kg (251 lb)   SpO2 96%   BMI 36.01 kg/m     Estimated body mass index is 36.01 kg/m  as calculated from the following:    Height as of this encounter: 1.778 m (5' 10\").    Weight as of this encounter: 113.9 kg (251 lb).    Physical Exam  GENERAL: alert and no distress  EYES: Eyes grossly normal to inspection, PERRL and conjunctivae and sclerae normal  HENT: ear canals and TM's normal, nose and mouth without ulcers or lesions  NECK: no adenopathy, no asymmetry, masses, or scars  RESP: lungs clear to auscultation - no rales, rhonchi or wheezes  CV: regular rate and rhythm, normal S1 S2, no S3 or S4, no murmur, click or rub, no peripheral edema  ABDOMEN: soft, nontender, no hepatosplenomegaly, no masses and bowel sounds normal   (male): Pt declines  RECTAL: Pt declines  MS: no gross musculoskeletal defects noted, no edema  SKIN: no suspicious lesions or rashes  NEURO: Normal strength and tone, mentation intact and speech normal  PSYCH: mentation appears normal, affect normal/bright    Signed Electronically by:              Yandel Novoa MD, FAAFP     [facility]  [address] [place] [id number]  [email or web address]   Office: [phone]  Fax: [phone]         "

## 2024-07-09 ENCOUNTER — LAB (OUTPATIENT)
Dept: LAB | Facility: CLINIC | Age: 54
End: 2024-07-09
Payer: COMMERCIAL

## 2024-07-09 DIAGNOSIS — Z86.0100 HISTORY OF COLONIC POLYPS: ICD-10-CM

## 2024-07-09 DIAGNOSIS — M10.09 IDIOPATHIC GOUT OF MULTIPLE SITES, UNSPECIFIED CHRONICITY: ICD-10-CM

## 2024-07-09 DIAGNOSIS — Z12.11 SCREEN FOR COLON CANCER: ICD-10-CM

## 2024-07-09 DIAGNOSIS — Z51.81 MEDICATION MONITORING ENCOUNTER: ICD-10-CM

## 2024-07-09 DIAGNOSIS — Z12.5 SCREENING FOR PROSTATE CANCER: ICD-10-CM

## 2024-07-09 DIAGNOSIS — E78.5 HYPERLIPIDEMIA LDL GOAL <130: ICD-10-CM

## 2024-07-09 DIAGNOSIS — Z00.00 ROUTINE GENERAL MEDICAL EXAMINATION AT A HEALTH CARE FACILITY: ICD-10-CM

## 2024-07-09 LAB
ALBUMIN SERPL BCG-MCNC: 4.4 G/DL (ref 3.5–5.2)
ALBUMIN UR-MCNC: NEGATIVE MG/DL
ALP SERPL-CCNC: 52 U/L (ref 40–150)
ALT SERPL W P-5'-P-CCNC: 40 U/L (ref 0–70)
ANION GAP SERPL CALCULATED.3IONS-SCNC: 9 MMOL/L (ref 7–15)
APPEARANCE UR: CLEAR
AST SERPL W P-5'-P-CCNC: 29 U/L (ref 0–45)
BILIRUB SERPL-MCNC: 0.6 MG/DL
BILIRUB UR QL STRIP: NEGATIVE
BUN SERPL-MCNC: 12.8 MG/DL (ref 6–20)
CALCIUM SERPL-MCNC: 9.3 MG/DL (ref 8.6–10)
CHLORIDE SERPL-SCNC: 101 MMOL/L (ref 98–107)
CHOLEST SERPL-MCNC: 216 MG/DL
CK SERPL-CCNC: 195 U/L (ref 39–308)
COLOR UR AUTO: YELLOW
CREAT SERPL-MCNC: 0.88 MG/DL (ref 0.67–1.17)
CREAT UR-MCNC: 87.4 MG/DL
DEPRECATED HCO3 PLAS-SCNC: 28 MMOL/L (ref 22–29)
EGFRCR SERPLBLD CKD-EPI 2021: >90 ML/MIN/1.73M2
ERYTHROCYTE [DISTWIDTH] IN BLOOD BY AUTOMATED COUNT: 13.1 % (ref 10–15)
FASTING STATUS PATIENT QL REPORTED: YES
FASTING STATUS PATIENT QL REPORTED: YES
GLUCOSE SERPL-MCNC: 102 MG/DL (ref 70–99)
GLUCOSE UR STRIP-MCNC: NEGATIVE MG/DL
HCT VFR BLD AUTO: 43.9 % (ref 40–53)
HDLC SERPL-MCNC: 56 MG/DL
HGB BLD-MCNC: 14.7 G/DL (ref 13.3–17.7)
HGB UR QL STRIP: NEGATIVE
KETONES UR STRIP-MCNC: NEGATIVE MG/DL
LDLC SERPL CALC-MCNC: 116 MG/DL
LEUKOCYTE ESTERASE UR QL STRIP: NEGATIVE
MCH RBC QN AUTO: 30.6 PG (ref 26.5–33)
MCHC RBC AUTO-ENTMCNC: 33.5 G/DL (ref 31.5–36.5)
MCV RBC AUTO: 91 FL (ref 78–100)
MICROALBUMIN UR-MCNC: <12 MG/L
MICROALBUMIN/CREAT UR: NORMAL MG/G{CREAT}
NITRATE UR QL: NEGATIVE
NONHDLC SERPL-MCNC: 160 MG/DL
PH UR STRIP: 6 [PH] (ref 5–7)
PLATELET # BLD AUTO: 186 10E3/UL (ref 150–450)
POTASSIUM SERPL-SCNC: 4.2 MMOL/L (ref 3.4–5.3)
PROT SERPL-MCNC: 7 G/DL (ref 6.4–8.3)
PSA SERPL DL<=0.01 NG/ML-MCNC: 0.47 NG/ML (ref 0–3.5)
RBC # BLD AUTO: 4.81 10E6/UL (ref 4.4–5.9)
SODIUM SERPL-SCNC: 138 MMOL/L (ref 135–145)
SP GR UR STRIP: 1.02 (ref 1–1.03)
T4 FREE SERPL-MCNC: 1.04 NG/DL (ref 0.9–1.7)
TRIGL SERPL-MCNC: 221 MG/DL
TSH SERPL DL<=0.005 MIU/L-ACNC: 5.16 UIU/ML (ref 0.3–4.2)
URATE SERPL-MCNC: 9.7 MG/DL (ref 3.4–7)
UROBILINOGEN UR STRIP-ACNC: 0.2 E.U./DL
WBC # BLD AUTO: 5 10E3/UL (ref 4–11)

## 2024-07-09 PROCEDURE — 36415 COLL VENOUS BLD VENIPUNCTURE: CPT

## 2024-07-09 PROCEDURE — 80053 COMPREHEN METABOLIC PANEL: CPT

## 2024-07-09 PROCEDURE — 84443 ASSAY THYROID STIM HORMONE: CPT

## 2024-07-09 PROCEDURE — 81003 URINALYSIS AUTO W/O SCOPE: CPT

## 2024-07-09 PROCEDURE — 82043 UR ALBUMIN QUANTITATIVE: CPT

## 2024-07-09 PROCEDURE — 82570 ASSAY OF URINE CREATININE: CPT

## 2024-07-09 PROCEDURE — G0103 PSA SCREENING: HCPCS

## 2024-07-09 PROCEDURE — 84550 ASSAY OF BLOOD/URIC ACID: CPT

## 2024-07-09 PROCEDURE — 85027 COMPLETE CBC AUTOMATED: CPT

## 2024-07-09 PROCEDURE — 82550 ASSAY OF CK (CPK): CPT

## 2024-07-09 PROCEDURE — 80061 LIPID PANEL: CPT

## 2024-07-09 PROCEDURE — 84439 ASSAY OF FREE THYROXINE: CPT

## 2024-07-15 ENCOUNTER — TELEPHONE (OUTPATIENT)
Dept: FAMILY MEDICINE | Facility: CLINIC | Age: 54
End: 2024-07-15
Payer: COMMERCIAL

## 2024-07-15 NOTE — LETTER
July 18, 2024      Bebo Lamb  [address]  [place] [facility] [address]        Dear ,    We are writing to inform you of your test results.     They showed:     Labs are overall quite good, except     Elevated Uric acid  Minimally elevated glucose  Elevated triglycerides  Normal thyroid, overall     We advise:     Continue current cares.  Balanced low cholesterol diet.  Regular exercise.  Weight loss.     Advise follow up visit, may be virtual     For additional lab test information, [email or web address] is an excellent reference.     Let us know if you have any questions or concerns.     Thank you for choosing us for your health care needs!     Sincerely,     Yandel Novoa MD, FAAFP            Component      Latest Ref Rng 7/9/2024  7:38 AM 7/9/2024  8:18 AM   Sodium      135 - 145 mmol/L 138     Potassium      3.4 - 5.3 mmol/L 4.2     Carbon Dioxide (CO2)      22 - 29 mmol/L 28     Anion Gap      7 - 15 mmol/L 9     Urea Nitrogen      6.0 - 20.0 mg/dL 12.8     Creatinine      0.67 - 1.17 mg/dL 0.88     GFR Estimate      >60 mL/min/1.73m2 >90     Calcium      8.6 - 10.0 mg/dL 9.3     Chloride      98 - 107 mmol/L 101     Glucose      70 - 99 mg/dL 102 (H)     Alkaline Phosphatase      40 - 150 U/L 52     AST      0 - 45 U/L 29     ALT      0 - 70 U/L 40     Protein Total      6.4 - 8.3 g/dL 7.0     Albumin      3.5 - 5.2 g/dL 4.4     Bilirubin Total      <=1.2 mg/dL 0.6     Patient Fasting? Yes     Patient Fasting? Yes     Color Urine      Colorless, Straw, Light Yellow, Yellow   Yellow    Appearance Urine      Clear   Clear    Glucose Urine      Negative mg/dL  Negative    Bilirubin Urine      Negative   Negative    Ketones Urine      Negative mg/dL  Negative    Specific Gravity Urine      1.003 - 1.035   1.025    Blood Urine      Negative   Negative    pH Urine      5.0 - 7.0   6.0    Protein Albumin Urine      Negative mg/dL  Negative    Urobilinogen Urine      0.2, 1.0 E.U./dL  0.2    Nitrite  Urine      Negative   Negative    Leukocyte Esterase Urine      Negative   Negative    WBC      4.0 - 11.0 10e3/uL 5.0     RBC Count      4.40 - 5.90 10e6/uL 4.81     Hemoglobin      13.3 - 17.7 g/dL 14.7     Hematocrit      40.0 - 53.0 % 43.9     MCV      78 - 100 fL 91     MCH      26.5 - 33.0 pg 30.6     MCHC      31.5 - 36.5 g/dL 33.5     RDW      10.0 - 15.0 % 13.1     Platelet Count      150 - 450 10e3/uL 186     Cholesterol      <200 mg/dL 216 (H)     Triglycerides      <150 mg/dL 221 (H)     HDL Cholesterol      >=40 mg/dL 56     LDL Cholesterol Calculated      <=100 mg/dL 116 (H)     Non HDL Cholesterol      <130 mg/dL 160 (H)     Creatinine Urine      mg/dL  87.4    Albumin Urine mg/L      mg/L  <12.0    Albumin Urine mg/g Cr  --    CK Total      39 - 308 U/L 195     TSH      0.30 - 4.20 uIU/mL 5.16 (H)     PSA Tumor Marker      0.00 - 3.50 ng/mL 0.47     Uric Acid      3.4 - 7.0 mg/dL 9.7 (H)     T4 Free      0.90 - 1.70 ng/dL 1.04        Legend:  (H) High

## 2024-07-15 NOTE — LETTER
July 18, 2024      Bebo Lamb  [address]  [place] [facility] [address]        Dear ,    We are writing to inform you of your test results.    Dear Bebo,     Your recent results have been reviewed.     They showed:     Labs are overall quite good, except     Elevated Uric acid  Minimally elevated glucose  Elevated triglycerides  Normal thyroid, overall     We advise:     Continue current cares.  Balanced low cholesterol diet.  Regular exercise.  Weight loss.     Advise follow up visit, may be virtual     For additional lab test information, [email or web address] is an excellent reference.     Let us know if you have any questions or concerns.     Thank you for choosing us for your health care needs!     Sincerely,              Yandel Novoa MD, FAAFP    No results found from the In Basket message.    If you have any questions or concerns, please call the clinic at the number listed above.       Sincerely,

## 2024-07-15 NOTE — TELEPHONE ENCOUNTER
Jaqui Shelton RN  7/15/2024 10:55 AM CDT Back to Top      Attempt # 1     Called # Telephone Information:  Mobile          [phone]        Left a non detailed VM     Jaqui Shelton RN, BSN  [place] T[facility]      Yandel Novoa MD  7/14/2024 10:33 AM CDT       Staff:  Call the patient     Dear Bebo,     Your recent results have been reviewed.     They showed:     Labs are overall quite good, except     Elevated Uric acid  Minimally elevated glucose  Elevated triglycerides  Normal thyroid, overall     We advise:     Continue current cares.  Balanced low cholesterol diet.  Regular exercise.  Weight loss.     Advise follow up visit, may be virtual     For additional lab test information, [email or web address] is an excellent reference.     Let us know if you have any questions or concerns.     Thank you for choosing us for your health care needs!     Sincerely,              Yandel Novoa MD, FAAFP     [address]  [id number] [phone] Fax

## 2024-07-16 NOTE — TELEPHONE ENCOUNTER
Attempt # 2    Called #   Telephone Information:   Mobile [phone]         Left a non detailed VM     Jaqui Shelton RN, BSN  [place]

## 2024-07-18 NOTE — TELEPHONE ENCOUNTER
Attempt # 3    Called # [phone]     Left a non detailed VM to call back at [phone] and ask for any available Triage Nurse.    Letter sent.     VIMAL WANG RN on 7/18/2024 at 3:30 PM   [facility]

## 2025-06-01 ENCOUNTER — HEALTH MAINTENANCE LETTER (OUTPATIENT)
Age: 55
End: 2025-06-01

## (undated) DEVICE — ENDO SNARE POLYPECTOMY OVAL 15MM LOOP SD-240U-15

## (undated) DEVICE — KIT ENDO TURNOVER/PROCEDURE W/CLEAN A SCOPE LINERS 103888

## (undated) RX ORDER — FENTANYL CITRATE 50 UG/ML
INJECTION, SOLUTION INTRAMUSCULAR; INTRAVENOUS
Status: DISPENSED
Start: 2023-07-07